# Patient Record
Sex: FEMALE | Race: WHITE | NOT HISPANIC OR LATINO | Employment: FULL TIME | URBAN - METROPOLITAN AREA
[De-identification: names, ages, dates, MRNs, and addresses within clinical notes are randomized per-mention and may not be internally consistent; named-entity substitution may affect disease eponyms.]

---

## 2021-06-29 ENCOUNTER — TRANSCRIBE ORDERS (OUTPATIENT)
Dept: ADMINISTRATIVE | Facility: HOSPITAL | Age: 41
End: 2021-06-29

## 2021-06-29 DIAGNOSIS — Z12.31 ENCOUNTER FOR MAMMOGRAM TO ESTABLISH BASELINE MAMMOGRAM: Primary | ICD-10-CM

## 2021-07-19 ENCOUNTER — HOSPITAL ENCOUNTER (OUTPATIENT)
Dept: MAMMOGRAPHY | Facility: HOSPITAL | Age: 41
Discharge: HOME | End: 2021-07-19
Admitting: NURSE PRACTITIONER

## 2021-07-19 DIAGNOSIS — Z12.31 ENCOUNTER FOR MAMMOGRAM TO ESTABLISH BASELINE MAMMOGRAM: ICD-10-CM

## 2021-07-19 PROCEDURE — 77067 SCR MAMMO BI INCL CAD: CPT

## 2021-07-22 ENCOUNTER — TRANSCRIBE ORDERS (OUTPATIENT)
Dept: MAMMOGRAPHY | Facility: HOSPITAL | Age: 41
End: 2021-07-22

## 2021-07-22 DIAGNOSIS — N63.20 LEFT BREAST MASS: Primary | ICD-10-CM

## 2021-07-29 ENCOUNTER — TRANSCRIBE ORDERS (OUTPATIENT)
Dept: ADMINISTRATIVE | Facility: HOSPITAL | Age: 41
End: 2021-07-29

## 2021-07-29 ENCOUNTER — HOSPITAL ENCOUNTER (OUTPATIENT)
Dept: MAMMOGRAPHY | Facility: HOSPITAL | Age: 41
Discharge: HOME | End: 2021-07-29

## 2021-07-29 ENCOUNTER — HOSPITAL ENCOUNTER (OUTPATIENT)
Dept: ULTRASOUND IMAGING | Facility: HOSPITAL | Age: 41
Discharge: HOME | End: 2021-07-29

## 2021-07-29 DIAGNOSIS — N63.20 LEFT BREAST MASS: ICD-10-CM

## 2021-07-29 DIAGNOSIS — R92.1 BREAST CALCIFICATIONS: Primary | ICD-10-CM

## 2021-07-29 PROCEDURE — 77065 DX MAMMO INCL CAD UNI: CPT

## 2021-07-29 PROCEDURE — 76641 ULTRASOUND BREAST COMPLETE: CPT

## 2021-08-09 ENCOUNTER — HOSPITAL ENCOUNTER (OUTPATIENT)
Dept: MAMMOGRAPHY | Facility: HOSPITAL | Age: 41
Discharge: HOME | End: 2021-08-09

## 2021-08-09 ENCOUNTER — DOCUMENTATION (OUTPATIENT)
Dept: MAMMOGRAPHY | Facility: HOSPITAL | Age: 41
End: 2021-08-09

## 2021-08-09 DIAGNOSIS — R92.1 BREAST CALCIFICATIONS: ICD-10-CM

## 2021-08-09 PROCEDURE — 19081 BX BREAST 1ST LESION STRTCTC: CPT | Performed by: RADIOLOGY

## 2021-08-09 PROCEDURE — 77065 DX MAMMO INCL CAD UNI: CPT | Performed by: RADIOLOGY

## 2021-08-09 PROCEDURE — 76098 X-RAY EXAM SURGICAL SPECIMEN: CPT

## 2021-08-09 PROCEDURE — 25010000003 LIDOCAINE 1 % SOLUTION: Performed by: NURSE PRACTITIONER

## 2021-08-09 PROCEDURE — 88305 TISSUE EXAM BY PATHOLOGIST: CPT | Performed by: NURSE PRACTITIONER

## 2021-08-09 PROCEDURE — 76098 X-RAY EXAM SURGICAL SPECIMEN: CPT | Performed by: RADIOLOGY

## 2021-08-09 RX ORDER — LIDOCAINE HYDROCHLORIDE 10 MG/ML
20 INJECTION, SOLUTION INFILTRATION; PERINEURAL ONCE
Status: COMPLETED | OUTPATIENT
Start: 2021-08-09 | End: 2021-08-09

## 2021-08-09 RX ORDER — LIDOCAINE HYDROCHLORIDE AND EPINEPHRINE 10; 10 MG/ML; UG/ML
10 INJECTION, SOLUTION INFILTRATION; PERINEURAL ONCE
Status: COMPLETED | OUTPATIENT
Start: 2021-08-09 | End: 2021-08-09

## 2021-08-09 RX ADMIN — LIDOCAINE HYDROCHLORIDE,EPINEPHRINE BITARTRATE 10 ML: 10; .01 INJECTION, SOLUTION INFILTRATION; PERINEURAL at 08:44

## 2021-08-09 RX ADMIN — LIDOCAINE HYDROCHLORIDE 20 ML: 10 INJECTION, SOLUTION INFILTRATION; PERINEURAL at 08:44

## 2021-08-09 NOTE — PROGRESS NOTES
SPOKE WITH PATIENT AFTER HER BIOPSY AND DISCUSSED DISCHARGE INSTRUCTIONS WITH ICE PACKS FOR PAIN CONTROL AND PT V/U. PT REPORTED THAT THIS IS HER BASELINE AND THAT IT WAS CAUGHT ON SCREENING AND SHE DOES NOT HAVE ANY OTHER COMPLAINTS RELATED TO HER BREASTS. PT HAD A MISCARRIAGE AT AGE 14 AND HAD NO OTHER CHILDREN. PT HAD HER PERIOD AT AGE 12. PT STATED THAT SHE HAD HAD SOME PRE CANCER CELLS IN HER FEMALE PARTS NOT SURE IT WAS CERVICAL, UTERINE, OR OVARIAN. SHE HAD A PARTIAL HYSTERECTOMY IN 2016 AND THE HER OVARIES REMOVED IN 2017 AND THAT WAS ALL CLEAR OF CANCER, BUT HAD SEVERAL CYSTS. PT REPORTED NOT TAKING ANY HORMONE REPLACEMENT THERAPY DUE TO HER HAVING PASTS STROKES. PT'S FATHER HAD PANCREATIC CANCER AND HER PATERNAL AUNT HAD BREAST CANCER, BOTH PATERNAL GRANDPARENTS HAD LUNG CANCER. I GAVE PATIENT MY CARD WITH MY CONTACT INFORMATION AND ENCOURAGED HER TO CALL WITH ANY QUESTIONS OR CONCERNS AND SHE V/U.

## 2021-08-10 LAB
CYTO UR: NORMAL
LAB AP CASE REPORT: NORMAL
LAB AP CLINICAL INFORMATION: NORMAL
PATH REPORT.FINAL DX SPEC: NORMAL
PATH REPORT.GROSS SPEC: NORMAL

## 2022-07-09 ENCOUNTER — APPOINTMENT (OUTPATIENT)
Dept: GENERAL RADIOLOGY | Facility: HOSPITAL | Age: 42
End: 2022-07-09

## 2022-07-09 ENCOUNTER — HOSPITAL ENCOUNTER (EMERGENCY)
Facility: HOSPITAL | Age: 42
Discharge: HOME | End: 2022-07-09
Attending: EMERGENCY MEDICINE | Admitting: EMERGENCY MEDICINE

## 2022-07-09 VITALS
DIASTOLIC BLOOD PRESSURE: 79 MMHG | SYSTOLIC BLOOD PRESSURE: 138 MMHG | BODY MASS INDEX: 25.86 KG/M2 | RESPIRATION RATE: 19 BRPM | WEIGHT: 151.46 LBS | OXYGEN SATURATION: 99 % | TEMPERATURE: 98 F | HEART RATE: 88 BPM | HEIGHT: 64 IN

## 2022-07-09 DIAGNOSIS — S52.592A OTHER CLOSED FRACTURE OF DISTAL END OF LEFT RADIUS, INITIAL ENCOUNTER: Primary | ICD-10-CM

## 2022-07-09 PROCEDURE — 99283 EMERGENCY DEPT VISIT LOW MDM: CPT

## 2022-07-09 PROCEDURE — 73110 X-RAY EXAM OF WRIST: CPT

## 2022-07-09 RX ORDER — HYDROCODONE BITARTRATE AND ACETAMINOPHEN 7.5; 325 MG/1; MG/1
1 TABLET ORAL ONCE
Status: COMPLETED | OUTPATIENT
Start: 2022-07-09 | End: 2022-07-09

## 2022-07-09 RX ORDER — HYDROCODONE BITARTRATE AND ACETAMINOPHEN 5; 325 MG/1; MG/1
1 TABLET ORAL EVERY 6 HOURS PRN
Qty: 15 TABLET | Refills: 0 | Status: SHIPPED | OUTPATIENT
Start: 2022-07-09 | End: 2022-07-13

## 2022-07-09 RX ADMIN — HYDROCODONE BITARTRATE AND ACETAMINOPHEN 1 TABLET: 7.5; 325 TABLET ORAL at 17:22

## 2022-07-09 NOTE — DISCHARGE INSTRUCTIONS
Take pain medication as needed.  Ensure that she call Dr. Stewart office to schedule a follow-up appointment for further care of your wrist fracture.  Rest and elevate the left arm.  You may apply ice over the splint as needed to improve pain.

## 2022-07-09 NOTE — ED PROVIDER NOTES
Time: 5:16 PM EDT  Arrived by: private car  Chief Complaint: Left wrist pain  History provided by: Patient  History is limited by: N/A     History of Present Illness:  Patient is a 42 y.o. year old female who presents to the emergency department with left wrist pain.    Patient states today around 1430 she was at the [employer] park with her kids and fell backwards catching herself with her hands.  Patient states after she fell she immediately had left wrist pain and felt as if it was weaker than the right.  Patient denies any previous injury tenderness.  Patient states that pain is currently 10 out of 10.  No other complaints.      History provided by:  Patient   used: No    Wrist Injury  Location:  Wrist  Wrist location:  L wrist  Injury: yes    Time since incident: 2 hours.  Mechanism of injury: fall    Fall:     Fall occurred: Skating.    Impact surface:  Hard floor    Point of impact:  Hands and buttocks    Entrapped after fall: no    Pain details:     Quality:  Aching    Radiates to:  Does not radiate    Severity:  Severe    Onset quality:  Sudden    Timing:  Constant    Progression:  Unchanged  Prior injury to area:  No  Relieved by:  None tried  Worsened by:  Movement  Ineffective treatments:  None tried  Associated symptoms: decreased range of motion and swelling    Associated symptoms: no back pain, no fatigue, no fever, no muscle weakness, no neck pain, no numbness, no stiffness and no tingling        Similar Symptoms Previously: No  Recently seen: No      Patient Care Team  Primary Care Provider: Bonnie Shelton APRN    Past Medical History:     Allergies   Allergen Reactions   • Oxycodone Itching     History reviewed. No pertinent past medical history.  History reviewed. No pertinent surgical history.  Family History   Problem Relation Age of Onset   • Breast cancer Paternal Aunt        Home Medications:  Prior to Admission medications    Not on File        Social History:  "  Social History     Tobacco Use   • Smoking status: Never Smoker   • Smokeless tobacco: Never Used   Substance Use Topics   • Alcohol use: Not Currently   • Drug use: Never     Recent travel: no     Review of Systems:  Review of Systems   Constitutional: Negative for chills, fatigue and fever.   HENT: Negative for ear pain.    Eyes: Negative for pain.   Respiratory: Negative for cough and shortness of breath.    Cardiovascular: Negative for chest pain.   Gastrointestinal: Negative for abdominal pain, diarrhea, nausea and vomiting.   Genitourinary: Negative for dysuria.   Musculoskeletal: Negative for arthralgias, back pain, neck pain and stiffness.        Left wrist pain   Skin: Negative for rash.   Neurological: Negative for headaches.        Physical Exam:  /79 (BP Location: Left arm, Patient Position: Sitting)   Pulse 88   Temp 98 °F (36.7 °C) (Oral)   Resp 19   Ht 162.6 cm (64\")   Wt 68.7 kg (151 lb 7.3 oz)   SpO2 99%   BMI 26.00 kg/m²     Physical Exam  Vitals and nursing note reviewed.   Constitutional:       General: She is not in acute distress.     Appearance: Normal appearance. She is normal weight. She is not ill-appearing.   HENT:      Head: Normocephalic and atraumatic.      Nose: Nose normal.   Eyes:      Extraocular Movements: Extraocular movements intact.      Conjunctiva/sclera: Conjunctivae normal.      Pupils: Pupils are equal, round, and reactive to light.   Cardiovascular:      Rate and Rhythm: Normal rate and regular rhythm.   Pulmonary:      Effort: Pulmonary effort is normal.   Musculoskeletal:         General: Normal range of motion.      Right wrist: Normal.        Arms:       Cervical back: Normal range of motion and neck supple.      Comments: Mild swelling noted as mentioned in the diagram above.  No deformity, laceration, ecchymosis noted decreased range of motion of the left wrist, also unable to evaluate due to pain.  Neurovascularly intact.   Skin:     General: Skin is " warm and dry.   Neurological:      General: No focal deficit present.      Mental Status: She is alert and oriented to person, place, and time.   Psychiatric:         Mood and Affect: Mood normal.         Behavior: Behavior normal.         Thought Content: Thought content normal.         Judgment: Judgment normal.                Medications in the Emergency Department:  Medications   HYDROcodone-acetaminophen (NORCO) 7.5-325 MG per tablet 1 tablet (1 tablet Oral Given 7/9/22 1722)        Labs  Lab Results (last 24 hours)     ** No results found for the last 24 hours. **           Imaging:  XR Wrist 3+ View Left    Result Date: 7/9/2022  PROCEDURE: XR WRIST 3+ VW LEFT  COMPARISON: None  INDICATIONS: injury/LEFT WRIST PAIN  FINDINGS:  Transverse fracture of the distal radial metaphysis is seen, with 20 degrees of dorsal angulation.  Numerous sub cm comminuted fragments are evident.  No definite extension to the articular surface is evident.  No abnormality of the distal ulna or carpal bones is evident.        Left wrist series demonstrating transverse fracture of the distal radial metaphysis with 20 degrees of dorsal angulation.  Numerous sub cm comminuted fragments are evident.      MARIO FRANCIS MD       Electronically Signed and Approved By: MARIO FRANCIS MD on 7/09/2022 at 16:50               Procedures:  Procedures    Progress  ED Course as of 07/09/22 1819   Sat Jul 09, 2022   1803 Thumb spica orthoglass was placed at this time and evaluated by me. [MD]      ED Course User Index  [MD] Jude Ba PA-C                            Medical Decision Making:  MDM  Number of Diagnoses or Management Options  Diagnosis management comments: I have spoken with patient. I have explained the patient´s condition, diagnoses and treatment plan based on the information available to me at this time. I have answered the patient's questions and addressed any concerns. The patient has a good  understanding of the patient´s  diagnosis, condition, and treatment plan as can be expected at this point. The vital signs have been stable. The patient´s condition is stable and appropriate for discharge from the emergency department.      The patient will pursue further outpatient evaluation with the primary care physician or other designated or consulting physician as outlined in the discharge instructions. They are agreeable to this plan of care and follow-up instructions have been explained in detail. The patient has received these instructions in written format and have expressed an understanding of the discharge instructions. The patient is aware that any significant change in condition or worsening of symptoms should prompt an immediate return to this or the closest emergency department or call to 911.       Amount and/or Complexity of Data Reviewed  Tests in the radiology section of CPT®: reviewed and ordered    Risk of Complications, Morbidity, and/or Mortality  Presenting problems: moderate  Diagnostic procedures: low  Management options: low    Patient Progress  Patient progress: stable       Final diagnoses:   Other closed fracture of distal end of left radius, initial encounter        Disposition:  ED Disposition     ED Disposition   Discharge    Condition   Stable    Comment   --             This medical record created using voice recognition software.           Jude Ba PA-C  07/09/22 [id number]

## 2022-07-09 NOTE — ED PROVIDER NOTES
"Patient is 42 y.o. year old female that presents to the ED for evaluation of left wrist injury.     Physical Exam    ED Course:    /79 (BP Location: Left arm, Patient Position: Sitting)   Pulse 88   Temp 98 °F (36.7 °C) (Oral)   Resp 19   Ht 162.6 cm (64\")   Wt 68.7 kg (151 lb 7.3 oz)   SpO2 99%   BMI 26.00 kg/m²   Results for orders placed or performed during the hospital encounter of 08/09/21   Tissue Pathology Exam    Specimen: Breast, Left; Tissue   Result Value Ref Range    Case Report       Surgical Pathology Report                         Case: [id number]                                  Authorizing Provider:  Omayra Wheeler MD     Collected:           08/09/2021 08:37 AM          Ordering Location:     [facility]      Received:            08/09/2021 09:18 AM                                 MAMMOGRAPHY                                                                  Pathologist:           Isabelle Interiano MD                                                     Specimen:    Breast, Left, LT BREAST STEREO BX/9:00                                                     Clinical Information       Breast calcifications      Final Diagnosis       Left breast, 9 o'clock position, stereotactic-guided core biopsy:   - Fibrosis  - Microcalcifications  - Fat necrosis  - Negative for atypia and malignancy        Gross Description       LT breast stereo BX/9:00: Received in formalin in a multi chambered x-ray specimen collection device are needle core biopsies of yellow to white fibrofatty tissue measuring 2.5 cm in greatest aggregate dimension.  Cores are found within chambers A through K with chambers C through I identified as containing microcalcifications.  All 1A and 1B.  1A-cores from chambers identified as containing microcalcifications, 1B-remainder CRE      Microscopic Description       Medications   HYDROcodone-acetaminophen (NORCO) 7.5-325 MG per tablet 1 tablet (1 tablet Oral Given " 7/9/22 1722)     XR Wrist 3+ View Left    Result Date: 7/9/2022  Narrative: PROCEDURE: XR WRIST 3+ VW LEFT  COMPARISON: None  INDICATIONS: injury/LEFT WRIST PAIN  FINDINGS:  Transverse fracture of the distal radial metaphysis is seen, with 20 degrees of dorsal angulation.  Numerous sub cm comminuted fragments are evident.  No definite extension to the articular surface is evident.  No abnormality of the distal ulna or carpal bones is evident.      Impression:   Left wrist series demonstrating transverse fracture of the distal radial metaphysis with 20 degrees of dorsal angulation.  Numerous sub cm comminuted fragments are evident.      MARIO FRANCIS MD       Electronically Signed and Approved By: MARIO FRANCIS MD on 7/09/2022 at 16:50               MDM:  X-rays reviewed which showed a left wrist fracture.  Patient will be splinted with pressure applied in attempt to decrease angulation of the fracture.  Patient referred to orthopedics for outpatient follow-up.  Procedures      The case was discussed between the AKIL and myself. Patient  care including, but not limited to ordered imaging, medications, and lab results were reviewed. I then performed the substantive portion of the visit including all aspects of the medical decision making.        Kings Alonzo DO  17:24 EDT  07/09/22       Kings Alonzo DO  07/09/22 6601

## 2022-07-11 ENCOUNTER — OFFICE VISIT (OUTPATIENT)
Dept: ORTHOPEDIC SURGERY | Facility: CLINIC | Age: 42
End: 2022-07-11

## 2022-07-11 ENCOUNTER — PREP FOR SURGERY (OUTPATIENT)
Dept: OTHER | Facility: HOSPITAL | Age: 42
End: 2022-07-11

## 2022-07-11 ENCOUNTER — LAB (OUTPATIENT)
Dept: LAB | Facility: HOSPITAL | Age: 42
End: 2022-07-11

## 2022-07-11 VITALS — HEART RATE: 88 BPM | BODY MASS INDEX: 25.44 KG/M2 | OXYGEN SATURATION: 98 % | HEIGHT: 64 IN | WEIGHT: 149 LBS

## 2022-07-11 DIAGNOSIS — S52.502A CLOSED FRACTURE OF DISTAL END OF LEFT RADIUS, UNSPECIFIED FRACTURE MORPHOLOGY, INITIAL ENCOUNTER: Primary | ICD-10-CM

## 2022-07-11 DIAGNOSIS — S52.502A CLOSED FRACTURE OF DISTAL END OF LEFT RADIUS, UNSPECIFIED FRACTURE MORPHOLOGY, INITIAL ENCOUNTER: ICD-10-CM

## 2022-07-11 PROCEDURE — U0004 COV-19 TEST NON-CDC HGH THRU: HCPCS

## 2022-07-11 PROCEDURE — 99203 OFFICE O/P NEW LOW 30 MIN: CPT | Performed by: STUDENT IN AN ORGANIZED HEALTH CARE EDUCATION/TRAINING PROGRAM

## 2022-07-11 RX ORDER — CEFAZOLIN SODIUM 2 G/100ML
2 INJECTION, SOLUTION INTRAVENOUS ONCE
Status: CANCELLED | OUTPATIENT
Start: 2022-07-11 | End: 2022-07-11

## 2022-07-11 RX ORDER — HYDROCODONE BITARTRATE AND ACETAMINOPHEN 5; 325 MG/1; MG/1
1 TABLET ORAL EVERY 6 HOURS PRN
Qty: 20 TABLET | Refills: 0 | Status: SHIPPED | OUTPATIENT
Start: 2022-07-11 | End: 2022-07-14 | Stop reason: HOSPADM

## 2022-07-11 RX ORDER — CEFAZOLIN SODIUM IN 0.9 % NACL 3 G/100 ML
3 INTRAVENOUS SOLUTION, PIGGYBACK (ML) INTRAVENOUS ONCE
Status: CANCELLED | OUTPATIENT
Start: 2022-07-11 | End: 2022-07-11

## 2022-07-11 RX ORDER — ASPIRIN 81 MG/1
81 TABLET, CHEWABLE ORAL
COMMUNITY
End: 2022-09-30

## 2022-07-11 NOTE — PROGRESS NOTES
"Chief Complaint  Pain of the Left Wrist    Subjective          Shanell Rollins presents to [facility] for   History of Present Illness    The patient presents here today for evaluation of the left wrist. The patient went skating and fell causing a left wrist injury. She was seen and evaluated with x-rays and placed into a splint. She has no other complaints. She is right hand dominate.  She denies any numbness.  She denies any prior left wrist injuries/surgeries.    Allergies   Allergen Reactions   • Oxycodone Itching        Social History     Socioeconomic History   • Marital status:    Tobacco Use   • Smoking status: Former Smoker   • Smokeless tobacco: Never Used   Vaping Use   • Vaping Use: Every day   • Substances: Nicotine, Flavoring   • Devices: Refillable tank   Substance and Sexual Activity   • Alcohol use: Not Currently   • Drug use: Never   • Sexual activity: Defer        I reviewed the patient's chief complaint, history of present illness, review of systems, past medical history, surgical history, family history, social history, medications, and allergy list.     REVIEW OF SYSTEMS    Constitutional: Denies fevers, chills, weight loss  Cardiovascular: Denies chest pain, shortness of breath  Skin: Denies rashes, acute skin changes  Neurologic: Denies headache, loss of consciousness  MSK: Left wrist pain      Objective   Vital Signs:   Pulse 88   Ht 162.6 cm (64\")   Wt 67.6 kg (149 lb)   SpO2 98%   BMI 25.58 kg/m²     Body mass index is 25.58 kg/m².    Physical Exam    General: Alert. No acute distress.   Left wrist- splint intact, well fitting. Clean and dry. No wounds about the splint edges. Good capillary refill. Intact finger and thumb ROM.  Sensation intact in the median, radial, ulnar nerve distributions.  Motor intact with AIN, PIN, ulnar function.    Procedures    Imaging Results (Most Recent)     None                   Assessment and Plan        XR Wrist " 3+ View Left    Result Date: 7/9/2022  Narrative: PROCEDURE: XR WRIST 3+ VW LEFT  COMPARISON: None  INDICATIONS: injury/LEFT WRIST PAIN  FINDINGS:  Transverse fracture of the distal radial metaphysis is seen, with 20 degrees of dorsal angulation.  Numerous sub cm comminuted fragments are evident.  No definite extension to the articular surface is evident.  No abnormality of the distal ulna or carpal bones is evident.      Impression:   Left wrist series demonstrating transverse fracture of the distal radial metaphysis with 20 degrees of dorsal angulation.  Numerous sub cm comminuted fragments are evident.      MARIO FRANCIS MD       Electronically Signed and Approved By: MARIO FRANCIS MD on 7/09/2022 at 16:50                Diagnoses and all orders for this visit:    1. Closed fracture of distal end of left radius, unspecified fracture morphology, initial encounter (Primary)        Discussed the treatment options with the patient, operative vs non-operative. Discussed the risks and benefits of an open reduction internal fixation left distal radius fracture. The patient expressed understanding and wished to proceed.       Will obtain X-Rays of Left wrist at next visit.     Discussed surgery., Risks/benefits discussed with patient including, but not limited to: infection, bleeding, neurovascular damage, malunion, nonunion, aesthetic deformity, need for further surgery, and death., Discussed with patient the implant type being used during surgery and patient understands and desires to proceed., Surgery pamphlet given. and Call or return if worsening symptoms.    Scribed for Cole Velásquez MD by Liliana Land  07/11/2022   14:53 EDT         Follow Up   Return for 2 weeks postop .  Patient was given instructions and counseling regarding her condition or for health maintenance advice. Please see specific information pulled into the AVS if appropriate.       I have personally performed the services described in  this document as scribed by the above individual and it is both accurate and complete.     Cole Velásquez MD  07/11/22  14:57 EDT

## 2022-07-11 NOTE — H&P (VIEW-ONLY)
"Chief Complaint  Pain of the Left Wrist    Subjective          Shanell Rollins presents to [facility] for   History of Present Illness    The patient presents here today for evaluation of the left wrist. The patient went skating and fell causing a left wrist injury. She was seen and evaluated with x-rays and placed into a splint. She has no other complaints. She is right hand dominate.  She denies any numbness.  She denies any prior left wrist injuries/surgeries.    Allergies   Allergen Reactions   • Oxycodone Itching        Social History     Socioeconomic History   • Marital status:    Tobacco Use   • Smoking status: Former Smoker   • Smokeless tobacco: Never Used   Vaping Use   • Vaping Use: Every day   • Substances: Nicotine, Flavoring   • Devices: Refillable tank   Substance and Sexual Activity   • Alcohol use: Not Currently   • Drug use: Never   • Sexual activity: Defer        I reviewed the patient's chief complaint, history of present illness, review of systems, past medical history, surgical history, family history, social history, medications, and allergy list.     REVIEW OF SYSTEMS    Constitutional: Denies fevers, chills, weight loss  Cardiovascular: Denies chest pain, shortness of breath  Skin: Denies rashes, acute skin changes  Neurologic: Denies headache, loss of consciousness  MSK: Left wrist pain      Objective   Vital Signs:   Pulse 88   Ht 162.6 cm (64\")   Wt 67.6 kg (149 lb)   SpO2 98%   BMI 25.58 kg/m²     Body mass index is 25.58 kg/m².    Physical Exam    General: Alert. No acute distress.   Left wrist- splint intact, well fitting. Clean and dry. No wounds about the splint edges. Good capillary refill. Intact finger and thumb ROM.  Sensation intact in the median, radial, ulnar nerve distributions.  Motor intact with AIN, PIN, ulnar function.    Procedures    Imaging Results (Most Recent)     None                   Assessment and Plan        XR Wrist " 3+ View Left    Result Date: 7/9/2022  Narrative: PROCEDURE: XR WRIST 3+ VW LEFT  COMPARISON: None  INDICATIONS: injury/LEFT WRIST PAIN  FINDINGS:  Transverse fracture of the distal radial metaphysis is seen, with 20 degrees of dorsal angulation.  Numerous sub cm comminuted fragments are evident.  No definite extension to the articular surface is evident.  No abnormality of the distal ulna or carpal bones is evident.      Impression:   Left wrist series demonstrating transverse fracture of the distal radial metaphysis with 20 degrees of dorsal angulation.  Numerous sub cm comminuted fragments are evident.      MARIO FRANCIS MD       Electronically Signed and Approved By: MARIO FRANCIS MD on 7/09/2022 at 16:50                Diagnoses and all orders for this visit:    1. Closed fracture of distal end of left radius, unspecified fracture morphology, initial encounter (Primary)        Discussed the treatment options with the patient, operative vs non-operative. Discussed the risks and benefits of an open reduction internal fixation left distal radius fracture. The patient expressed understanding and wished to proceed.       Will obtain X-Rays of Left wrist at next visit.     Discussed surgery., Risks/benefits discussed with patient including, but not limited to: infection, bleeding, neurovascular damage, malunion, nonunion, aesthetic deformity, need for further surgery, and death., Discussed with patient the implant type being used during surgery and patient understands and desires to proceed., Surgery pamphlet given. and Call or return if worsening symptoms.    Scribed for Cole Velásquez MD by Liliana Land  07/11/2022   14:53 EDT         Follow Up   Return for 2 weeks postop .  Patient was given instructions and counseling regarding her condition or for health maintenance advice. Please see specific information pulled into the AVS if appropriate.       I have personally performed the services described in  this document as scribed by the above individual and it is both accurate and complete.     Cole Velásquez MD  07/11/22  14:57 EDT

## 2022-07-12 LAB — SARS-COV-2 RNA PNL SPEC NAA+PROBE: NOT DETECTED

## 2022-07-13 NOTE — PRE-PROCEDURE INSTRUCTIONS
IMPORTANT INSTRUCTIONS - PRE-ADMISSION TESTING  1. DO NOT EAT OR CHEW anything after midnight the night before your procedure.    2. You may have CLEAR liquids up to ____2__ hours prior to ARRIVAL time.   3. Take the following medications the morning of your procedure with JUST A SIP OF WATER:  ______NORCO IF NEEDED_________________________________________________________________________________________________________________________________________________________________________________    4. DO NOT BRING your medications to the hospital with you, UNLESS something has changed since your PRE-Admission Testing appointment.  5. Hold all vitamins, supplements, and NSAIDS (Non- steroidal anti-inflammatory meds) for one week prior to surgery (you MAY take Tylenol or Acetaminophen).  6. If you are diabetic, check your blood sugar the morning of your procedure. If it is less than 70 or if you are feeling symptomatic, call the following number for further instructions: [phone]  7. Use your inhalers/nebulizers as usual, the morning of your procedure. BRING YOUR INHALERS with you.   8. Bring your CPAP or BIPAP to hospital, ONLY IF YOU WILL BE SPENDING THE NIGHT.   9. Make sure you have a ride home and have someone who will stay with you the day of your procedure after you go home.  10. If you have any questions, please call your Pre-Admission Testing Nurse, ___CESILIA_____________ at [phone]__.   11. Per anesthesia request, do not smoke for 24 hours before your procedure or as instructed by your surgeon.    12. BATHING INSTRUCTIONS GIVEN. NO JEWELRY DAY OF PROCEDURE. NO NAIL POLISH UPPER OR LOWER EXTREMITIES.  13. ELEVATOR A 3RD FLOOR  14. CASH, CHECK, OR CARD FOR MED TO BED IF INDICATED AT DISCHARGE  15. NO VAPING FOR 24 HOURS PRIOR TO PROCEDURE

## 2022-07-14 ENCOUNTER — APPOINTMENT (OUTPATIENT)
Dept: GENERAL RADIOLOGY | Facility: HOSPITAL | Age: 42
End: 2022-07-14

## 2022-07-14 ENCOUNTER — HOSPITAL ENCOUNTER (OUTPATIENT)
Facility: HOSPITAL | Age: 42
Setting detail: HOSPITAL OUTPATIENT SURGERY
Discharge: HOME | End: 2022-07-14
Attending: STUDENT IN AN ORGANIZED HEALTH CARE EDUCATION/TRAINING PROGRAM | Admitting: STUDENT IN AN ORGANIZED HEALTH CARE EDUCATION/TRAINING PROGRAM

## 2022-07-14 ENCOUNTER — ANESTHESIA EVENT (OUTPATIENT)
Dept: PERIOP | Facility: HOSPITAL | Age: 42
End: 2022-07-14

## 2022-07-14 ENCOUNTER — ANESTHESIA (OUTPATIENT)
Dept: PERIOP | Facility: HOSPITAL | Age: 42
End: 2022-07-14

## 2022-07-14 VITALS
DIASTOLIC BLOOD PRESSURE: 83 MMHG | OXYGEN SATURATION: 100 % | TEMPERATURE: 97.4 F | HEIGHT: 63 IN | SYSTOLIC BLOOD PRESSURE: 124 MMHG | WEIGHT: 143.3 LBS | RESPIRATION RATE: 16 BRPM | HEART RATE: 74 BPM | BODY MASS INDEX: 25.39 KG/M2

## 2022-07-14 DIAGNOSIS — S52.502A CLOSED FRACTURE OF DISTAL END OF LEFT RADIUS, UNSPECIFIED FRACTURE MORPHOLOGY, INITIAL ENCOUNTER: ICD-10-CM

## 2022-07-14 PROCEDURE — C1713 ANCHOR/SCREW BN/BN,TIS/BN: HCPCS | Performed by: STUDENT IN AN ORGANIZED HEALTH CARE EDUCATION/TRAINING PROGRAM

## 2022-07-14 PROCEDURE — 25010000002 HYDROMORPHONE 1 MG/ML SOLUTION: Performed by: ANESTHESIOLOGY

## 2022-07-14 PROCEDURE — 25010000002 ONDANSETRON PER 1 MG: Performed by: NURSE ANESTHETIST, CERTIFIED REGISTERED

## 2022-07-14 PROCEDURE — 73100 X-RAY EXAM OF WRIST: CPT

## 2022-07-14 PROCEDURE — 25607 OPTX DST RD XARTC FX/EPI SEP: CPT | Performed by: STUDENT IN AN ORGANIZED HEALTH CARE EDUCATION/TRAINING PROGRAM

## 2022-07-14 PROCEDURE — 25010000002 MIDAZOLAM PER 1 MG: Performed by: ANESTHESIOLOGY

## 2022-07-14 PROCEDURE — 25010000002 CEFAZOLIN IN DEXTROSE 2-4 GM/100ML-% SOLUTION: Performed by: STUDENT IN AN ORGANIZED HEALTH CARE EDUCATION/TRAINING PROGRAM

## 2022-07-14 PROCEDURE — 0 MEPERIDINE PER 100 MG: Performed by: NURSE ANESTHETIST, CERTIFIED REGISTERED

## 2022-07-14 PROCEDURE — 76942 ECHO GUIDE FOR BIOPSY: CPT | Performed by: STUDENT IN AN ORGANIZED HEALTH CARE EDUCATION/TRAINING PROGRAM

## 2022-07-14 PROCEDURE — 76000 FLUOROSCOPY <1 HR PHYS/QHP: CPT

## 2022-07-14 PROCEDURE — 25010000002 HYDROMORPHONE 1 MG/ML SOLUTION: Performed by: NURSE ANESTHETIST, CERTIFIED REGISTERED

## 2022-07-14 PROCEDURE — 25010000002 FENTANYL CITRATE (PF) 50 MCG/ML SOLUTION: Performed by: NURSE ANESTHETIST, CERTIFIED REGISTERED

## 2022-07-14 PROCEDURE — 25607 OPTX DST RD XARTC FX/EPI SEP: CPT | Performed by: PHYSICIAN ASSISTANT

## 2022-07-14 PROCEDURE — 25010000002 PROPOFOL 10 MG/ML EMULSION: Performed by: NURSE ANESTHETIST, CERTIFIED REGISTERED

## 2022-07-14 PROCEDURE — 0 LIDOCAINE 1 % SOLUTION: Performed by: NURSE ANESTHETIST, CERTIFIED REGISTERED

## 2022-07-14 PROCEDURE — 25010000002 DEXAMETHASONE PER 1 MG: Performed by: NURSE ANESTHETIST, CERTIFIED REGISTERED

## 2022-07-14 DEVICE — BONE SCREW, FULLY THREADED, T8
Type: IMPLANTABLE DEVICE | Site: WRIST | Status: FUNCTIONAL
Brand: VARIAX

## 2022-07-14 DEVICE — LOCKING SCREW, FULLY THREADED,T8
Type: IMPLANTABLE DEVICE | Site: WRIST | Status: FUNCTIONAL
Brand: VARIAX

## 2022-07-14 DEVICE — VOLAR PLATE INTERMEDIATE LEFT, X-SHORT
Type: IMPLANTABLE DEVICE | Site: WRIST | Status: FUNCTIONAL
Brand: VARIAX

## 2022-07-14 RX ORDER — PROMETHAZINE HYDROCHLORIDE 12.5 MG/1
25 TABLET ORAL ONCE AS NEEDED
Status: DISCONTINUED | OUTPATIENT
Start: 2022-07-14 | End: 2022-07-14 | Stop reason: HOSPADM

## 2022-07-14 RX ORDER — ONDANSETRON 4 MG/1
4 TABLET, FILM COATED ORAL EVERY 8 HOURS PRN
Qty: 30 TABLET | Refills: 0 | Status: SHIPPED | OUTPATIENT
Start: 2022-07-14 | End: 2022-09-30

## 2022-07-14 RX ORDER — SODIUM CHLORIDE, SODIUM LACTATE, POTASSIUM CHLORIDE, CALCIUM CHLORIDE 600; 310; 30; 20 MG/100ML; MG/100ML; MG/100ML; MG/100ML
9 INJECTION, SOLUTION INTRAVENOUS CONTINUOUS PRN
Status: DISCONTINUED | OUTPATIENT
Start: 2022-07-14 | End: 2022-07-14 | Stop reason: HOSPADM

## 2022-07-14 RX ORDER — DEXAMETHASONE SODIUM PHOSPHATE 4 MG/ML
INJECTION, SOLUTION INTRA-ARTICULAR; INTRALESIONAL; INTRAMUSCULAR; INTRAVENOUS; SOFT TISSUE AS NEEDED
Status: DISCONTINUED | OUTPATIENT
Start: 2022-07-14 | End: 2022-07-14 | Stop reason: SURG

## 2022-07-14 RX ORDER — ONDANSETRON 2 MG/ML
4 INJECTION INTRAMUSCULAR; INTRAVENOUS ONCE AS NEEDED
Status: DISCONTINUED | OUTPATIENT
Start: 2022-07-14 | End: 2022-07-14 | Stop reason: HOSPADM

## 2022-07-14 RX ORDER — ACETAMINOPHEN 500 MG
1000 TABLET ORAL ONCE
Status: COMPLETED | OUTPATIENT
Start: 2022-07-14 | End: 2022-07-14

## 2022-07-14 RX ORDER — HYDROCODONE BITARTRATE AND ACETAMINOPHEN 7.5; 325 MG/1; MG/1
1 TABLET ORAL EVERY 4 HOURS PRN
Qty: 40 TABLET | Refills: 0 | Status: SHIPPED | OUTPATIENT
Start: 2022-07-14 | End: 2022-07-25 | Stop reason: SDUPTHER

## 2022-07-14 RX ORDER — PROMETHAZINE HYDROCHLORIDE 25 MG/1
25 SUPPOSITORY RECTAL ONCE AS NEEDED
Status: DISCONTINUED | OUTPATIENT
Start: 2022-07-14 | End: 2022-07-14 | Stop reason: HOSPADM

## 2022-07-14 RX ORDER — PHENYLEPHRINE HCL IN 0.9% NACL 1 MG/10 ML
SYRINGE (ML) INTRAVENOUS AS NEEDED
Status: DISCONTINUED | OUTPATIENT
Start: 2022-07-14 | End: 2022-07-14 | Stop reason: SURG

## 2022-07-14 RX ORDER — MIDAZOLAM HYDROCHLORIDE 1 MG/ML
3 INJECTION INTRAMUSCULAR; INTRAVENOUS ONCE
Status: COMPLETED | OUTPATIENT
Start: 2022-07-14 | End: 2022-07-14

## 2022-07-14 RX ORDER — ONDANSETRON 2 MG/ML
INJECTION INTRAMUSCULAR; INTRAVENOUS AS NEEDED
Status: DISCONTINUED | OUTPATIENT
Start: 2022-07-14 | End: 2022-07-14 | Stop reason: SURG

## 2022-07-14 RX ORDER — PROPOFOL 10 MG/ML
VIAL (ML) INTRAVENOUS AS NEEDED
Status: DISCONTINUED | OUTPATIENT
Start: 2022-07-14 | End: 2022-07-14 | Stop reason: SURG

## 2022-07-14 RX ORDER — GLYCOPYRROLATE 0.2 MG/ML
0.2 INJECTION INTRAMUSCULAR; INTRAVENOUS
Status: COMPLETED | OUTPATIENT
Start: 2022-07-14 | End: 2022-07-14

## 2022-07-14 RX ORDER — LIDOCAINE HYDROCHLORIDE 10 MG/ML
INJECTION, SOLUTION INFILTRATION; PERINEURAL AS NEEDED
Status: DISCONTINUED | OUTPATIENT
Start: 2022-07-14 | End: 2022-07-14 | Stop reason: SURG

## 2022-07-14 RX ORDER — MEPERIDINE HYDROCHLORIDE 25 MG/ML
12.5 INJECTION INTRAMUSCULAR; INTRAVENOUS; SUBCUTANEOUS
Status: DISCONTINUED | OUTPATIENT
Start: 2022-07-14 | End: 2022-07-14 | Stop reason: HOSPADM

## 2022-07-14 RX ORDER — FENTANYL CITRATE 50 UG/ML
INJECTION, SOLUTION INTRAMUSCULAR; INTRAVENOUS AS NEEDED
Status: DISCONTINUED | OUTPATIENT
Start: 2022-07-14 | End: 2022-07-14 | Stop reason: SURG

## 2022-07-14 RX ORDER — GLYCOPYRROLATE 0.2 MG/ML
INJECTION INTRAMUSCULAR; INTRAVENOUS AS NEEDED
Status: DISCONTINUED | OUTPATIENT
Start: 2022-07-14 | End: 2022-07-14 | Stop reason: SURG

## 2022-07-14 RX ORDER — KETAMINE HCL IN NACL, ISO-OSM 100MG/10ML
SYRINGE (ML) INJECTION AS NEEDED
Status: DISCONTINUED | OUTPATIENT
Start: 2022-07-14 | End: 2022-07-14 | Stop reason: SURG

## 2022-07-14 RX ORDER — CEFAZOLIN SODIUM 2 G/100ML
2 INJECTION, SOLUTION INTRAVENOUS ONCE
Status: COMPLETED | OUTPATIENT
Start: 2022-07-14 | End: 2022-07-14

## 2022-07-14 RX ORDER — HYDROCODONE BITARTRATE AND ACETAMINOPHEN 5; 325 MG/1; MG/1
1 TABLET ORAL ONCE AS NEEDED
Status: COMPLETED | OUTPATIENT
Start: 2022-07-14 | End: 2022-07-14

## 2022-07-14 RX ORDER — BUPIVACAINE HYDROCHLORIDE AND EPINEPHRINE 5; 5 MG/ML; UG/ML
INJECTION, SOLUTION EPIDURAL; INTRACAUDAL; PERINEURAL
Status: COMPLETED | OUTPATIENT
Start: 2022-07-14 | End: 2022-07-14

## 2022-07-14 RX ORDER — MAGNESIUM HYDROXIDE 1200 MG/15ML
LIQUID ORAL AS NEEDED
Status: DISCONTINUED | OUTPATIENT
Start: 2022-07-14 | End: 2022-07-14 | Stop reason: HOSPADM

## 2022-07-14 RX ORDER — DOCUSATE SODIUM 100 MG/1
100 CAPSULE, LIQUID FILLED ORAL 2 TIMES DAILY PRN
Qty: 20 CAPSULE | Refills: 0 | Status: SHIPPED | OUTPATIENT
Start: 2022-07-14 | End: 2022-09-30

## 2022-07-14 RX ORDER — CEFAZOLIN SODIUM IN 0.9 % NACL 3 G/100 ML
3 INTRAVENOUS SOLUTION, PIGGYBACK (ML) INTRAVENOUS ONCE
Status: DISCONTINUED | OUTPATIENT
Start: 2022-07-14 | End: 2022-07-14 | Stop reason: SDUPTHER

## 2022-07-14 RX ADMIN — FENTANYL CITRATE 50 MCG: 50 INJECTION, SOLUTION INTRAMUSCULAR; INTRAVENOUS at 08:49

## 2022-07-14 RX ADMIN — FENTANYL CITRATE 50 MCG: 50 INJECTION, SOLUTION INTRAMUSCULAR; INTRAVENOUS at 08:31

## 2022-07-14 RX ADMIN — GLYCOPYRROLATE 0.2 MG: 0.2 INJECTION INTRAMUSCULAR; INTRAVENOUS at 07:25

## 2022-07-14 RX ADMIN — HYDROMORPHONE HYDROCHLORIDE 0.5 MG: 1 INJECTION, SOLUTION INTRAMUSCULAR; INTRAVENOUS; SUBCUTANEOUS at 11:33

## 2022-07-14 RX ADMIN — Medication 100 MCG: at 08:48

## 2022-07-14 RX ADMIN — Medication 100 MCG: at 08:56

## 2022-07-14 RX ADMIN — GLYCOPYRROLATE 0.2 MG: 0.2 INJECTION INTRAMUSCULAR; INTRAVENOUS at 08:30

## 2022-07-14 RX ADMIN — Medication 100 MCG: at 08:39

## 2022-07-14 RX ADMIN — SODIUM CHLORIDE, POTASSIUM CHLORIDE, SODIUM LACTATE AND CALCIUM CHLORIDE: 600; 310; 30; 20 INJECTION, SOLUTION INTRAVENOUS at 09:31

## 2022-07-14 RX ADMIN — MIDAZOLAM HYDROCHLORIDE 3 MG: 1 INJECTION, SOLUTION INTRAMUSCULAR; INTRAVENOUS at 07:25

## 2022-07-14 RX ADMIN — ONDANSETRON 4 MG: 2 INJECTION INTRAMUSCULAR; INTRAVENOUS at 08:44

## 2022-07-14 RX ADMIN — PROPOFOL 150 MG: 10 INJECTION, EMULSION INTRAVENOUS at 08:31

## 2022-07-14 RX ADMIN — CEFAZOLIN SODIUM 2 G: 2 INJECTION, SOLUTION INTRAVENOUS at 08:28

## 2022-07-14 RX ADMIN — DEXAMETHASONE SODIUM PHOSPHATE 8 MG: 4 INJECTION, SOLUTION INTRA-ARTICULAR; INTRALESIONAL; INTRAMUSCULAR; INTRAVENOUS; SOFT TISSUE at 08:44

## 2022-07-14 RX ADMIN — BUPIVACAINE HYDROCHLORIDE AND EPINEPHRINE BITARTRATE 30 ML: 5; .005 INJECTION, SOLUTION EPIDURAL; INTRACAUDAL; PERINEURAL at 07:49

## 2022-07-14 RX ADMIN — HYDROMORPHONE HYDROCHLORIDE 0.5 MG: 1 INJECTION, SOLUTION INTRAMUSCULAR; INTRAVENOUS; SUBCUTANEOUS at 10:16

## 2022-07-14 RX ADMIN — MEPERIDINE HYDROCHLORIDE 12.5 MG: 25 INJECTION INTRAMUSCULAR; INTRAVENOUS; SUBCUTANEOUS at 10:19

## 2022-07-14 RX ADMIN — Medication 30 MG: at 09:01

## 2022-07-14 RX ADMIN — HYDROCODONE BITARTRATE AND ACETAMINOPHEN 1 TABLET: 5; 325 TABLET ORAL at 10:37

## 2022-07-14 RX ADMIN — SODIUM CHLORIDE, POTASSIUM CHLORIDE, SODIUM LACTATE AND CALCIUM CHLORIDE: 600; 310; 30; 20 INJECTION, SOLUTION INTRAVENOUS at 08:28

## 2022-07-14 RX ADMIN — ACETAMINOPHEN 1000 MG: 500 TABLET ORAL at 07:25

## 2022-07-14 RX ADMIN — LIDOCAINE HYDROCHLORIDE 50 MG: 10 INJECTION, SOLUTION INFILTRATION; PERINEURAL at 08:31

## 2022-07-14 NOTE — ADDENDUM NOTE
Addendum  created 07/14/22 1120 by Divina Bagley MD    Order list changed, Pharmacy for encounter modified

## 2022-07-14 NOTE — ANESTHESIA POSTPROCEDURE EVALUATION
Patient: Shanell Rollins    Procedure Summary     Date: 07/14/22 Room / Location: [facility] OR 03 / [facility] MAIN OR    Anesthesia Start: 0828 Anesthesia Stop: 0938    Procedure: OPEN REDUCTION INTERNAL FIXATION OF LEFT DISTAL RADIUS FRACTURE (Left Wrist) Diagnosis:       Closed fracture of distal end of left radius, unspecified fracture morphology, initial encounter      (Closed fracture of distal end of left radius, unspecified fracture morphology, initial encounter [S52.502A])    Surgeons: Cole Velásquez MD Provider: Joseph Ashley MD    Anesthesia Type: general, general with block ASA Status: 2          Anesthesia Type: general, general with block    Vitals  Vitals Value Taken Time   /88 07/14/22 1002   Temp 36.8 °C (98.2 °F) 07/14/22 0940   Pulse 92 07/14/22 1005   Resp 16 07/14/22 0950   SpO2 98 % 07/14/22 1005   Vitals shown include unvalidated device data.        Post Anesthesia Care and Evaluation    Patient location during evaluation: bedside  Patient participation: complete - patient participated  Level of consciousness: awake  Pain score: 0  Pain management: adequate    Airway patency: patent  Anesthetic complications: No anesthetic complications  PONV Status: none  Cardiovascular status: acceptable and stable  Respiratory status: acceptable and room air  Hydration status: acceptable    Comments: An Anesthesiologist personally participated in the most demanding procedures (including induction and emergence if applicable) in the anesthesia plan, monitored the course of anesthesia administration at frequent intervals and remained physically present and available for immediate diagnosis and treatment of emergencies.

## 2022-07-14 NOTE — ANESTHESIA PROCEDURE NOTES
Peripheral Block      Patient reassessed immediately prior to procedure    Patient location during procedure: pre-op  Start time: 7/14/2022 7:44 AM  Stop time: 7/14/2022 7:47 AM  Reason for block: at surgeon's request and post-op pain management  Performed by  Anesthesiologist: Joseph Ashley MD  Preanesthetic Checklist  Completed: patient identified, IV checked, site marked, risks and benefits discussed, surgical consent, monitors and equipment checked, pre-op evaluation and timeout performed  Prep:  Pt Position: supine  Sterile barriers:alcohol skin prep, cap, partial drape, washed/disinfected hands, gloves, mask and sterile barriers  Prep: ChloraPrep  Patient monitoring: blood pressure monitoring, continuous pulse oximetry and EKG  Procedure    Sedation: yes  Performed under: local infiltration  Guidance:ultrasound guided    ULTRASOUND INTERPRETATION.  Using ultrasound guidance a 22 G gauge needle was placed in close proximity to the brachial plexus nerve, at which point, under ultrasound guidance anesthetic was injected in the area of the nerve and spread of the anesthesia was seen on ultrasound in close proximity thereto.  There were no abnormalities seen on ultrasound; a digital image was taken; and the patient tolerated the procedure with no complications. Images:still images obtained, printed/placed on chart    Laterality:left  Block Type:supraclavicular  Injection Technique:single-shot  Needle Type:echogenic  Needle Gauge:22 G (2in)  Resistance on Injection: none    Medications Used: bupivacaine-EPINEPHrine PF (MARCAINE w/EPI) 0.5% -1:193525 injection, 30 mL      Post Assessment  Injection Assessment: negative aspiration for heme, no paresthesia on injection and incremental injection  Patient Tolerance:comfortable throughout block  Complications:no  Additional Notes  The block or continuous infusion is requested by the referring physician for management of postoperative pain, or pain related to a  procedure. Ultrasound guidance (deemed medically necessary). Painless injection, pt was awake and conversant during the procedure without complications. Needle and surrounding structures visualized throughout procedure. No adverse reactions or complications seen during this period. Post-procedure image showed no signs of complication, and anatomy was consistent with an uncomplicated nerve blockade.

## 2022-07-14 NOTE — DISCHARGE INSTRUCTIONS
Orthopedic instructions: No lifting with the operative arm.  Keep your splint on, intact, clean, dry at all times.  Ice and elevate help reduce pain and swelling.  Perform finger and elbow range of motion exercises to help prevent stiffness.  Use your sling as needed.  Monitor for increasing pain, drainage through the splint, fevers, chills.  Call the office with any concerns.  Follow-up in 2 weeks for reevaluation.   DISCHARGE INSTRUCTIONS  ORTHOPEDICS      For your surgery you had:  General anesthesia (you may have a sore throat for the first 24 hours)  IV sedation.  Local anesthesia  Monitored anesthesia care  You received a medicated patch for nausea prevention today (behind the ear). It is recommended that you remove it 24-48 hours post-operatively. It must be removed within 72 hours.   You received an anesthesia medication today that can cause hormonal forms of birth control to be ineffective. You should use a different form of birth control (to prevent pregnancy) for 7 days.   You may experience dizziness, drowsiness, or light-headedness for several hours following surgery  Do not stay alone today or tonight.  Limit your activity for 24 hours.  Resume your diet slowly.  Follow whatever special dietary instructions you may have been given by the doctor.  You should not drive or operate machinery or drink alcohol for 24 hours or while you are taking pain medication.  You should not sign any legally binding documents.  If you had an axillary or regional block, you will not have control of the involved limb for up to 12 hours.  Protect the arm with a sling or follow your physician's specific instructions.  You may remove dressing:  [] in 24 hours  [] in 48 hours  [] Other:    You may shower or bathe:    Sleep with the injured part elevated on a pillow.  Medications per physician's instructions as indicated on Discharge Medication Information Sheet.  Follow verbal instructions of your doctor.  Carry the upper arm  in a sling so that the hand and wrist are above the level of the heart.  Sit with the lower leg propped up on a footstool or chair with pillows.  Exercise fingers or toes for 10 minutes every hour while awake. Ice bag to injured area for 72 hours.  Apply 20 minutes on - 20 minutes off.  Never place ice directly on skin or cast.    Avoid getting cast or dressing wet.  The Cold Therapy System can help reduce swelling and decrease pain.  Utilize device for 30-60 minutes per session, with 30-60 minute breaks in between sessions.  It is recommended to use, as directed, for the first 72 hours after surgery until bedtime.  After 72 hours, continue using the device as needed until your follow-up appointment with your physician.  Never place directly on skin.  Please refer to the instruction sheet given.  In addition to these instructions, follow the discharge instructions on postoperative arthroscopic surgery.  SPECIAL INSTRUCTIONS:           Last dose of pain medication was given at: 1036   NOTIFY THE PHYSICIAN IF YOU EXPERIENCE:  Numbness of fingers or toes.  Inability to move fingers or toes.  Extreme coldness, paleness or blue dis-coloration of fingers or toes.  Excessive swelling of affected surgical site or swelling that causes the cast to rub or cut into skin.  Pain unrelieved by pain medication  Nausea/vomiting not relieved by prescribed medication  Unable to urinate in 6 hours after surgery  Temperature greater than 101 degree Fahrenheit or chills  If unable to reach your doctor, please go to the closest emergency room  You should see Dr. Velásquez  for follow-up care   on July 27th at 0830  .   Phone number: [phone]

## 2022-07-14 NOTE — INTERVAL H&P NOTE
H&P reviewed. The patient was examined and there are no changes to the H&P.     I have seen and examined the above patient and agree with the plan.     Cardiac: Intact peripheral pulses  Pulmonary: Nonlabored respirations on room air.   Left upper extremity: Splint intact.  Distal neurovascular intact.        We reviewed the risks, benefits, indications, and alternatives to open reduction internal fixation of the left distal radius fracture.  The patient elected to proceed and consent was obtained.      Electronically signed by Cole Velásquez MD, 07/14/22, 6:44 AM EDT.

## 2022-07-14 NOTE — ANESTHESIA PREPROCEDURE EVALUATION
Anesthesia Evaluation     Patient summary reviewed and Nursing notes reviewed   no history of anesthetic complications:  NPO Solid Status: > 8 hours  NPO Liquid Status: > 2 hours           Airway   Mallampati: II  TM distance: >3 FB  Neck ROM: full  No difficulty expected  Dental    (+) edentulous    Pulmonary - normal exam    breath sounds clear to auscultation  (+) a smoker Former,   Cardiovascular - negative cardio ROS and normal exam  Exercise tolerance: good (4-7 METS)    Rhythm: regular  Rate: normal        Neuro/Psych  (+) TIA,    GI/Hepatic/Renal/Endo - negative ROS     Musculoskeletal (-) negative ROS    Abdominal    Substance History - negative use     OB/GYN negative ob/gyn ROS         Other - negative ROS                       Anesthesia Plan    ASA 2     general and general with block     (Patient understands anesthesia not responsible for dental damage.)  intravenous induction     Anesthetic plan, risks, benefits, and alternatives have been provided, discussed and informed consent has been obtained with: patient.  Use of blood products discussed with patient .   Plan discussed with CRNA.        CODE STATUS:

## 2022-07-14 NOTE — OP NOTE
WRIST OPEN REDUCTION INTERNAL FIXATION  Procedure Report    Patient Name:  Shanell Rollins  YOB: 1980    Date of Surgery:  7/14/2022     Indications: Shanell is a 42-year-old female who sustained a fall with a left distal radius fracture.  We discussed treatment options.  We discussed nonoperative management with casting.  We discussed surgical management with open reduction internal fixation of the left distal radius fracture.  We discussed benefits of surgery including improved alignment, fracture stability, and a decreased period of immobilization.  We discussed surgical risks including bleeding, infection, damage to nerves and blood vessels, hardware complications, nonunion, malunion, persistent pain, stiffness, posttraumatic arthritis, anesthesia risk including mortality, DVT, and need for additional procedures.  The patient elected to proceed and consent was obtained.    Pre-op Diagnosis:   Closed fracture of distal end of left radius, unspecified fracture morphology, initial encounter [S52.502A]       Post-Op Diagnosis Codes:     * Closed fracture of distal end of left radius, unspecified fracture morphology, initial encounter [S52.502A]    Procedure/CPT® Codes:      Procedure(s):  OPEN REDUCTION INTERNAL FIXATION OF LEFT DISTAL RADIUS FRACTURE, 2 part extra-articular fracture    Staff:  Surgeon(s):  Cole Velásquez MD    Assistant: Desiree Ware PA-C    Anesthesia: General with Block    Estimated Blood Loss: 30 mL    Implants:    Implant Name Type Inv. Item Serial No.  Lot No. LRB No. Used Action   PLT VOLR D/R XSHT 10H LT STRL - [id number] Implant PLT VOLR D/R XSHT 10H LT STRL  [employer]  Left 1 Implanted   SCRW NL BONE FUL/THRD T8 2.7X16MM - [id number] Implant SCRW NL BONE FUL/THRD T8 2.7X16MM  [employer]  Left 1 Implanted   SCRW LK BONE VARIAX FUL/THRD V8 2.4X18MM - [id number] Implant SCRW LK BONE VARIAX FUL/THRD V8 2.4X18MM  [employer]  Left 1 Implanted   SCRW LK  BONE VARIAX FUL/THRD V8 2.7X20MM - [id number] Implant SCRW LK BONE VARIAX FUL/THRD V8 2.7X20MM  [employer]  Left 2 Implanted   SCRW LK BONE VARIAX FUL/THRD V8 2.4X22MM - [id number] Implant SCRW LK BONE VARIAX FUL/THRD V8 2.4X22MM  [employer]  Left 1 Implanted   SCRW LK BONE FUL/THRD T8 2.7X14MM - [id number] Implant SCRW LK BONE FUL/THRD T8 2.7X14MM  [employer]  Left 1 Implanted   SCRW LK BONE FUL/THRD T8 2.7X16MM - [id number] Implant SCRW LK BONE FUL/THRD T8 2.7X16MM  [employer]  Left 1 Implanted       Specimen:          None        Findings: Displaced extra-articular left distal radius fracture    Complications: None    Description of Procedure: The patient was met in the preoperative holding area and the operative extremity was marked.  The patient underwent a preoperative peripheral nerve block with the anesthesia team.  The patient was transported to the operating room and laid supine on the operating room table.  General anesthesia was induced without complication.  2 g of preoperative Ancef were administered.  An upper arm tourniquet was applied.  The left upper extremity was prepped and draped in the usual sterile fashion.  A timeout was taken to ensure the appropriate patient, procedure, and procedural site.  All were in agreement.  The left upper extremity was exsanguinated and the tourniquet was inflated to 250 mmHg.  I made a 5 cm longitudinal incision over the FCR tendon sheath for a standard FCR-based approach to the distal radius.  Sharp dissection was carried down through the skin and subcutaneous tissues.  The FCR tendon sheath was identified.  This was split longitudinally in line with the incision.  The FCR tendon was retracted ulnarly.  The floor of the FCR tendon sheath was sharply incised.  The underlying FPL muscle belly was identified and retracted ulnarly.  Deep retractors were inserted.  The pronator quadratus was identified.  This was sharply elevated off the distal radius.  A soft  tissue elevator was slid proximally in anticipation of plate placement.  The fracture site was identified.  The fracture site was debrided of soft tissue and hematoma.  The fracture site was irrigated.  A Dunellen elevator and manual traction was used to reduce the fracture.  The fracture was temporarily held in place with a K wire through the radial styloid.  C-arm confirmed my reduction.  I then selected a Finco variax intermediate right distal radius plate.  This was positioned on the volar surface of the distal radius and temporarily held in place with 2 K wires.  Plate positioning and reduction were confirmed with C-arm fluoroscopy.  I was satisfied with both.  I then began my fixation.  I placed 1 bicortical nonlocking screw in the oblong hole of the plate.  This was partially inserted.  I then moved distally and placed two unicortical locking screws in the central 2 holes of the distal row of the plate.  I then applied a volar force across the distal radius and secured the proximal end of the plate down to bone by fully tightening the previously inserted nonlocking screw in the oblong hole.  This reduced the plate to bone nicely.  C-arm was brought in and confirmed appropriate hardware positioning and maintained reduction with this maneuver.  I moved distally and placed 2 additional unicortical locking screws in the distal row of the plate. The distal screws were final tightened.  I moved proximally.  I placed 2 bicortical locking screws in the most proximal holes of the plate.  K wires and deep retractors were removed.  C-arm fluoroscopy was used to confirm reduction and hardware positioning.  Final images were obtained in the AP and lateral planes including a 20 degree elevated view.  No intra-articular screw penetration was noted.  No hardware complications were noted overall.  Reduction was satisfactory.  The wound was then thoroughly irrigated with normal saline.  The wound was packed with a sponge and  the tourniquet was released.  Hemostasis was obtained with the Bovie electrocautery.  No arterial bleeding was identified.  The patient had a palpable radial pulse and well-perfused hand.  Subcutaneous tissues were closed with 3-0 Vicryl.  Skin was closed with 4-0 nylon.  The wound was dressed with Xeroform and 4 x 4's.  A well-padded sugar tong splint was applied.  The patient awoke from anesthesia without complication and was transferred to the recovery room in stable condition.  All surgical counts were correct.    Assistant: Desiree Ware PA-C  was responsible for performing the following activities: Retraction, Suction, Irrigation, Suturing, Closing and Placing Dressing and their skilled assistance was necessary for the success of this case.    Cole Velásquez MD     Date: 7/14/2022  Time: 09:45 EDT

## 2022-07-25 ENCOUNTER — TELEPHONE (OUTPATIENT)
Dept: ORTHOPEDIC SURGERY | Facility: CLINIC | Age: 42
End: 2022-07-25

## 2022-07-25 DIAGNOSIS — S52.502A CLOSED FRACTURE OF DISTAL END OF LEFT RADIUS, UNSPECIFIED FRACTURE MORPHOLOGY, INITIAL ENCOUNTER: ICD-10-CM

## 2022-07-25 RX ORDER — HYDROCODONE BITARTRATE AND ACETAMINOPHEN 7.5; 325 MG/1; MG/1
1 TABLET ORAL EVERY 4 HOURS PRN
Qty: 40 TABLET | Refills: 0 | Status: SHIPPED | OUTPATIENT
Start: 2022-07-25 | End: 2022-09-30

## 2022-07-25 NOTE — TELEPHONE ENCOUNTER
PATIENT REQUESTING PAIN MEDICATION REFILL. [facility] [address] [place] AND [facility].

## 2022-07-25 NOTE — TELEPHONE ENCOUNTER
TRIED SEVERAL TIMES TO GET HOLD OF PATIENT TO INFORM HER THAT THE PAPERWORK IS DONE & READY FOR P/U AT THE .

## 2022-07-26 NOTE — PROGRESS NOTES
"Chief Complaint  Follow-up of the Left Wrist    Subjective          Shanell Rollins presents to [facility] for   History of Present Illness    Shanell Rollins presents today for follow-up of left wrist.  Patient is 2 weeks postop left wrist ORIF performed by Dr. Velásquez on 2022. Today, patient states she is doing okay.  She has remained in her splint following surgery.  She reports a throbbing soreness to her wrist.  She has been taking hydrocodone every 6 hours.  She reports stiffness after splint removal.  She denies fever or chills.  Denies redness or drainage from her incision.  Denies new injuries.  Denies numbness or tingling.      Allergies   Allergen Reactions   • Oxycodone Itching     AND BECOMES ANXIOUS AND MEAN        Social History     Socioeconomic History   • Marital status:    Tobacco Use   • Smoking status: Former Smoker     Quit date: 2021     Years since quittin.7   • Smokeless tobacco: Never Used   Vaping Use   • Vaping Use: Every day   • Substances: Nicotine, Flavoring   • Devices: Refillable tank   Substance and Sexual Activity   • Alcohol use: Not Currently   • Drug use: Never   • Sexual activity: Defer        I reviewed the patient's chief complaint, history of present illness, review of systems, past medical history, surgical history, family history, social history, medications, and allergy list.     REVIEW OF SYSTEMS    Constitutional: Denies fevers, chills, weight loss  Cardiovascular: Denies chest pain, shortness of breath  Skin: Denies rashes, acute skin changes  Neurologic: Denies headache, loss of consciousness  MSK: Left wrist pain      Objective   Vital Signs:   Pulse 74   Ht 160 cm (63\")   Wt 66.3 kg (146 lb 3.2 oz)   SpO2 95%   BMI 25.90 kg/m²     Body mass index is 25.9 kg/m².    Physical Exam    General: Alert. No acute distress.   Left upper extremity: Splint removed today.  Sutures removed today without complications.  " Incision is healing well.  No active drainage or redness noted.  No concerning signs for infection.  Minimal swelling.  Forearm soft.  Diffuse resolving bruising to the forearm.  Stiffness with finger flexion.  Full finger extension.  Demonstrates active wrist flexion and extension with associated stiffness.  Less than 2-second capillary refill.  Sensation intact to median, radial, and ulnar nerve distributions.  Palpable radial pulse.      Procedures    Imaging Results (Most Recent)     Procedure Component Value Units Date/Time    XR Wrist 2 View Left [[id number]] Resulted: 07/27/22 1030     Updated: 07/27/22 1032    Narrative:      Indications: Follow-up left wrist fracture    Views: AP and lateral left wrist    Findings: Left distal radius fracture is seen.  Fracture is well aligned.    Fracture fixation with plate and screw construct remains in place compared   to intraoperative films.  No evidence of hardware complications or   loosening noted.  No additional fractures noted.    Comparative Data: Comparative data found and reviewed today.                   Assessment and Plan {CC Problem List  Visit Diagnosis   ROS  Review (Popup)  Health Maintenance  Quality  BestPractice  Medications  SmartSets  SnapShot Encounters  Media :23}   Diagnoses and all orders for this visit:    1. Closed fracture of distal end of left radius with routine healing, unspecified fracture morphology, subsequent encounter (Primary)  -     XR Wrist 2 View Left  -     Ambulatory Referral to Occupational Therapy         Shanell LING Ayo presents today 2 weeks postop left wrist open reduction internal fixation performed by Dr. Velásquez on 7/14/2022.  X-rays were reviewed with the patient today. Sutures removed today without complications.  Incision is well-healing.  No active drainage or redness noted.  No concerning signs for infection.  Patient denies fever or chills. Incision site care was reviewed today. Patient instructed not  to soak or submerge incision. Do not apply any lotions, creams, or ointments to the incision at this time.  We discussed concerning signs and symptoms regarding the incision site. We discussed applying gauze over the incision while wearing wrist brace to avoid irritation.  Patient understood and agreed.  Patient was given a comfort form wrist brace today.  She is instructed to wear this wrist brace at all times for the next 2 to 3 weeks aside from hygiene and working on wrist and finger range of motion exercises.  Formal occupational therapy was ordered today.  We discussed the importance of working on making a tight fist and performing gentle range of motion exercises at home.  Patient is limited to a 1 pound lifting restriction at this time.  Work note was written today.  We discussed the importance of weaning from narcotic medications.  Patient understood and agreed.  Patient will follow up in 4 weeks for reevaluation.  We will obtain new x-rays of the left wrist at next visit.      Call or return if symptoms worsen or patient has any concerns.   Will obtain X-Rays of left wrist at next visit.         Follow Up   Return in about 4 weeks (around 8/24/2022).  Patient was given instructions and counseling regarding her condition or for health maintenance advice. Please see specific information pulled into the AVS if appropriate.     Desiree Ware PA-C  07/27/22  10:32 EDT

## 2022-07-27 ENCOUNTER — OFFICE VISIT (OUTPATIENT)
Dept: ORTHOPEDIC SURGERY | Facility: CLINIC | Age: 42
End: 2022-07-27

## 2022-07-27 VITALS — BODY MASS INDEX: 25.91 KG/M2 | HEART RATE: 74 BPM | HEIGHT: 63 IN | OXYGEN SATURATION: 95 % | WEIGHT: 146.2 LBS

## 2022-07-27 DIAGNOSIS — S52.502D CLOSED FRACTURE OF DISTAL END OF LEFT RADIUS WITH ROUTINE HEALING, UNSPECIFIED FRACTURE MORPHOLOGY, SUBSEQUENT ENCOUNTER: Primary | ICD-10-CM

## 2022-07-27 PROCEDURE — 99024 POSTOP FOLLOW-UP VISIT: CPT | Performed by: PHYSICIAN ASSISTANT

## 2022-08-09 ENCOUNTER — TREATMENT (OUTPATIENT)
Dept: PHYSICAL THERAPY | Facility: CLINIC | Age: 42
End: 2022-08-09

## 2022-08-09 DIAGNOSIS — M25.532 ACUTE PAIN OF LEFT WRIST: ICD-10-CM

## 2022-08-09 DIAGNOSIS — M25.632 WRIST STIFFNESS, LEFT: ICD-10-CM

## 2022-08-09 DIAGNOSIS — S62.102S LEFT WRIST FRACTURE, SEQUELA: Primary | ICD-10-CM

## 2022-08-09 PROCEDURE — 97165 OT EVAL LOW COMPLEX 30 MIN: CPT | Performed by: OCCUPATIONAL THERAPIST

## 2022-08-09 NOTE — PROGRESS NOTES
"Occupational Therapy Initial Evaluation and Plan of Care      Patient: Shanell Rollins   : 1980  Diagnosis/ICD-10 Code:  Left wrist fracture, sequela [S62.102S]  Referring practitioner: Desiree Ware PA-C  Date of Initial Visit: 2022  Today's Date: 2022  Patient seen for 1 sessions               Subjective Evaluation    History of Present Illness  Date of onset: 2022  Date of surgery: 2022  Mechanism of injury: Patient is a 42 year old RHD female.  She reports that while roller skating she fell and injured her L wrist.  Xray revealed a distal radius fracture.  On 22 she underwent ORIF with plate and screws.  She is now 3 1/2 weeks s/p surgery.  She is referred to therapy for evaluation and treatment.  Her complaints include pain, stiffness and decreased functional use of the L arm and hand.  She reports that her R shoulder has been sore.    PMHx includes: B shoulder pain, TIA       Patient Occupation:  at [employer]. RTW date 22   Precautions and Work Restrictions: off workPain  Current pain ratin  At best pain ratin  At worst pain ratin  Quality: dull ache, sharp and discomfort  Relieving factors: medications and rest  Progression: improved    Social Support  Lives with: family.    Hand dominance: right    Diagnostic Tests  X-ray: abnormal    Patient Goals  Patient goals for therapy: decreased pain, increased motion, increased strength, independence with ADLs/IADLs, return to sport/leisure activities, return to work and decreased edema  Patient goal: \"I want to be able to open doors and get back to normal life.\"         Quick Dash 39/55 60-79% functional limitation    Objective          Observations     Additional Wrist/Hand Observation Details  Patient presents with a prefab wrist support on her L wrist    Tenderness     Additional Tenderness Details  Moderate along incision    Neurological Testing     Sensation     Wrist/Hand   Left   Intact: light " touch    Active Range of Motion     Left Wrist   Wrist flexion: 15 degrees   Wrist extension: 20 degrees   Radial deviation: 5 degrees   Ulnar deviation: 10 degrees     Additional Active Range of Motion Details  Total active wrist motion 50 degrees   Forearm pronation 40 supination 45  Shoulder and elbow AROM WNL's  Finger ROM is WNLs.  Lacks 3cm to distal palmar crease in thumb flexion.  Able to oppose to small finger tip    Strength/Myotome Testing     Additional Strength Details  Deferred    Swelling     Left Wrist/Hand     Additional Swelling Details  Mild-mod in L wrist          Assessment & Plan     Assessment  Impairments: abnormal coordination, abnormal muscle tone, abnormal or restricted ROM, activity intolerance, impaired physical strength, lacks appropriate home exercise program and pain with function  Functional Limitations: carrying objects, lifting, sleeping, pulling, pushing, uncomfortable because of pain, reaching behind back, reaching overhead and unable to perform repetitive tasksPrognosis: good    Goals  Plan Goals: Wrist Pain  LTG 1  12 weeks:  Decrease pain to 1/10 to improve sleep and performance of ADLs   Status:  New  STG 1  6 weeks:  Decrease pain to 4/10  Status:  New    2.  Decreased wrist AROM  LTG 2  12 weeks:  Increase wrist ROM to 120 degrees of total active motion to improve ability to grasp  Status:  New  STG 2  6 weeks:  Increase wrist ROM to 80 degrees of total active motion  Status:  New    3.  Decreased strength  LTG 3  12 weeks:  Increase strength to 5/5 MMT and  strength to norms for age group to improve ability to grasp, lift, carry and handle objects  Status:  New  LTG 3  6 weeks:  Good tolerance to strength testing  Status:  New    4.  Decreased functional use of the wrist and hand  LTG 4  12 weeks:  Improved function score to 19/55 or better  Status:  New  LTG 4  6 weeks:  Improved function score to 27 or better  Status:  New      Plan  Planned modality interventions:  iontophoresis, contrast bath immersion, cryotherapy, electrical stimulation/Russian stimulation, hydrotherapy, TENS, thermotherapy (hydrocollator packs), thermotherapy (paraffin bath) and ultrasound  Planned therapy interventions: ADL retraining, balance/weight-bearing training, body mechanics training, compression, fine motor coordination training, flexibility, manual therapy, neuromuscular re-education, motor coordination training, orthotic fitting/training, postural training, soft tissue mobilization, spinal/joint mobilization, strengthening, stretching, IADL retraining, home exercise program and functional ROM exercises  Frequency: 2x week  Duration in weeks: 12  Treatment plan discussed with: patient  Plan details: Reviewed use of modalities, instructed in shoulder, forearm and wrist AROM exercises        Patient will be seen for skilled OT services    Visit Diagnoses:    ICD-10-CM ICD-9-CM   1. Left wrist fracture, sequela  S62.102S 905.2   2. Acute pain of left wrist  M25.532 719.43   3. Wrist stiffness, left  M25.632 719.53       Timed:  Manual Therapy:                 0     mins  92695  Therapeutic Exercise:      0     mins  80855     Neuromuscular reeducation       0     mins 73676  Therapeutic Activity              0     mins  26756  Ultrasound:                  0     mins  41052     Untimed:  Low eval:                       40     mins  97547  Mod eval                        0     mins  67947  Electrical Stimulation:    0     mins  01050 ( );  Fuidotherapy     0     mins  57199      Timed Treatment:   0   mins   Total Treatment:     40   mins    OT SIGNATURE: Atif Ann OT, CHT  Electronic Signature  [place] license #: [id number]      Initial Certification  Certification Period: 8/9/2022 thru 11/6/2022  I certify that the therapy services are furnished while this patient is under my care.  The services outlined above are required by this patient, and will be reviewed every 90 days.     PHYSICIAN: Chaz  DOM Ramírez   NPI: [id number]     DATE:       Please sign and return via fax to [phone].. Thank you, [facility].

## 2022-08-11 ENCOUNTER — TREATMENT (OUTPATIENT)
Dept: PHYSICAL THERAPY | Facility: CLINIC | Age: 42
End: 2022-08-11

## 2022-08-11 DIAGNOSIS — M25.532 ACUTE PAIN OF LEFT WRIST: ICD-10-CM

## 2022-08-11 DIAGNOSIS — S62.102S LEFT WRIST FRACTURE, SEQUELA: Primary | ICD-10-CM

## 2022-08-11 DIAGNOSIS — M25.632 WRIST STIFFNESS, LEFT: ICD-10-CM

## 2022-08-11 PROCEDURE — 97110 THERAPEUTIC EXERCISES: CPT | Performed by: OCCUPATIONAL THERAPIST

## 2022-08-11 PROCEDURE — 97140 MANUAL THERAPY 1/> REGIONS: CPT | Performed by: OCCUPATIONAL THERAPIST

## 2022-08-11 NOTE — PROGRESS NOTES
"Occupational Therapy Daily Treatment Note      Patient: Shanell Rollins   : 1980  Referring practitioner: Desiree Ware PA-C  Date of Initial Visit: Type: THERAPY  Noted: 2022  Today's Date: 2022  Patient seen for 2 sessions           Subjective Evaluation    History of Present Illness  Date of onset: 2022  Date of surgery: 2022  Mechanism of injury: ORIF with plate and screws.      PMHx includes: B shoulder pain, TIA     Subjective comment: No significant changes noted.  Patient Occupation:  at [email or web address]. RTW date 22   Precautions and Work Restrictions: off workPain  Current pain ratin    Social Support  Lives with: family.    Hand dominance: right    Patient Goals  Patient goal: \"I want to be able to open doors and get back to normal life.\"           Objective   See Exercise, Manual, and Modality Logs for complete treatment.       Assessment & Plan     Assessment    Assessment details: Good tolerance to exercises.Shoulder discomfort has resolved.        Visit Diagnoses:    ICD-10-CM ICD-9-CM   1. Left wrist fracture, sequela  S62.102S 905.2   2. Acute pain of left wrist  M25.532 719.43   3. Wrist stiffness, left  M25.632 719.53       Progress per Plan of Care           Timed:  Manual Therapy:                 10     mins  06532  Therapeutic Exercise:      15     mins  08058     Neuromuscular reeducation       0     mins 87694  Therapeutic Activity              0     mins  78132  Ultrasound:                  0     mins  79420     Untimed:  Electrical Stimulation:    0     mins  26711 ( );  Fluidotherapy      0     mins  95319    Timed Treatment:   25   mins   Total Treatment:     25   mins    Atif Ann OT, BABATUNDET  Occupational Therapist    Electronically signed      [place] license #: [id number]  "

## 2022-08-16 ENCOUNTER — TREATMENT (OUTPATIENT)
Dept: PHYSICAL THERAPY | Facility: CLINIC | Age: 42
End: 2022-08-16

## 2022-08-16 DIAGNOSIS — M25.632 WRIST STIFFNESS, LEFT: ICD-10-CM

## 2022-08-16 DIAGNOSIS — S62.102S LEFT WRIST FRACTURE, SEQUELA: Primary | ICD-10-CM

## 2022-08-16 DIAGNOSIS — M25.532 ACUTE PAIN OF LEFT WRIST: ICD-10-CM

## 2022-08-16 PROCEDURE — 97110 THERAPEUTIC EXERCISES: CPT | Performed by: OCCUPATIONAL THERAPIST

## 2022-08-16 PROCEDURE — 97140 MANUAL THERAPY 1/> REGIONS: CPT | Performed by: OCCUPATIONAL THERAPIST

## 2022-08-16 NOTE — PROGRESS NOTES
"Occupational Therapy Daily Treatment Note      Patient: Shanell Rollins   : 1980  Referring practitioner: Desiree Ware PA-C  Date of Initial Visit: Type: THERAPY  Noted: 2022  Today's Date: 2022  Patient seen for 3 sessions           Subjective Evaluation    History of Present Illness  Date of onset: 2022  Date of surgery: 2022  Mechanism of injury: ORIF with plate and screws.      PMHx includes: B shoulder pain, TIA     Subjective comment: \"Doing good\"  \"Alleve helps with pain\"  Patient Occupation:  at [employer]. RTW date 22   Precautions and Work Restrictions: off workPain  Current pain ratin    Social Support  Lives with: family.    Hand dominance: right    Patient Goals  Patient goal: \"I want to be able to open doors and get back to normal life.\"           Objective   See Exercise, Manual, and Modality Logs for complete treatment.       Assessment/Plan    Visit Diagnoses:    ICD-10-CM ICD-9-CM   1. Left wrist fracture, sequela  S62.102S 905.2   2. Acute pain of left wrist  M25.532 719.43   3. Wrist stiffness, left  M25.632 719.53       Progress per Plan of Care           Timed:  Manual Therapy:                 10     mins  96507  Therapeutic Exercise:      15     mins  38675     Neuromuscular reeducation       0     mins 87601  Therapeutic Activity              0     mins  16759  Ultrasound:                  0     mins  86099     Untimed:  Electrical Stimulation:    0     mins  65084 ( );  Fluidotherapy      0     mins  71026    Timed Treatment:   25   mins   Total Treatment:     25   mins    Atif Ann OT, BABATUNDET  Occupational Therapist    Electronically signed      [place] license #: [id number]  "

## 2022-08-18 ENCOUNTER — TREATMENT (OUTPATIENT)
Dept: PHYSICAL THERAPY | Facility: CLINIC | Age: 42
End: 2022-08-18

## 2022-08-18 DIAGNOSIS — M25.632 WRIST STIFFNESS, LEFT: ICD-10-CM

## 2022-08-18 DIAGNOSIS — M25.532 ACUTE PAIN OF LEFT WRIST: ICD-10-CM

## 2022-08-18 DIAGNOSIS — S62.102S LEFT WRIST FRACTURE, SEQUELA: Primary | ICD-10-CM

## 2022-08-18 PROCEDURE — 97110 THERAPEUTIC EXERCISES: CPT | Performed by: OCCUPATIONAL THERAPIST

## 2022-08-18 PROCEDURE — 97140 MANUAL THERAPY 1/> REGIONS: CPT | Performed by: OCCUPATIONAL THERAPIST

## 2022-08-18 NOTE — PROGRESS NOTES
"Occupational Therapy Daily Treatment Note      Patient: Shanell Rollins   : 1980  Referring practitioner: Desiree Ware PA-C  Date of Initial Visit: Type: THERAPY  Noted: 2022  Today's Date: 2022  Patient seen for 4 sessions           Subjective Evaluation    History of Present Illness  Date of onset: 2022  Date of surgery: 2022  Mechanism of injury: ORIF with plate and screws.      PMHx includes: B shoulder pain, TIA     Subjective comment: Reports gradual improvement.  Patient Occupation:  at [email or web address]. RTW date 22   Precautions and Work Restrictions: off workPain  Current pain rating: 3    Social Support  Lives with: family.    Hand dominance: right    Patient Goals  Patient goal: \"I want to be able to open doors and get back to normal life.\"           Objective   See Exercise, Manual, and Modality Logs for complete treatment.       Assessment/Plan    Visit Diagnoses:    ICD-10-CM ICD-9-CM   1. Left wrist fracture, sequela  S62.102S 905.2   2. Acute pain of left wrist  M25.532 719.43   3. Wrist stiffness, left  M25.632 719.53       Progress per Plan of Care           Timed:  Manual Therapy:                 10     mins  40957  Therapeutic Exercise:      15     mins  41241     Neuromuscular reeducation       0     mins 36830  Therapeutic Activity              0     mins  95151  Ultrasound:                  0     mins  87088     Untimed:  Electrical Stimulation:    0     mins  89937 ( );  Fluidotherapy      0     mins  95966    Timed Treatment:   25   mins   Total Treatment:     25   mins    Atif Ann OT, BABATUNDET  Occupational Therapist    Electronically signed      [place] license #: [id number]  "

## 2022-08-23 ENCOUNTER — TREATMENT (OUTPATIENT)
Dept: PHYSICAL THERAPY | Facility: CLINIC | Age: 42
End: 2022-08-23

## 2022-08-23 DIAGNOSIS — M25.532 ACUTE PAIN OF LEFT WRIST: ICD-10-CM

## 2022-08-23 DIAGNOSIS — M25.632 WRIST STIFFNESS, LEFT: ICD-10-CM

## 2022-08-23 DIAGNOSIS — S62.102S LEFT WRIST FRACTURE, SEQUELA: Primary | ICD-10-CM

## 2022-08-23 PROCEDURE — 97110 THERAPEUTIC EXERCISES: CPT | Performed by: OCCUPATIONAL THERAPIST

## 2022-08-23 PROCEDURE — 97140 MANUAL THERAPY 1/> REGIONS: CPT | Performed by: OCCUPATIONAL THERAPIST

## 2022-08-23 NOTE — PROGRESS NOTES
"Occupational Therapy Daily Treatment Note      Patient: Shanell Rollins   : 1980  Referring practitioner: Desiree Ware PA-C  Date of Initial Visit: Type: THERAPY  Noted: 2022  Today's Date: 2022  Patient seen for 5 sessions           Subjective Evaluation    History of Present Illness  Date of onset: 2022  Date of surgery: 2022  Mechanism of injury: ORIF with plate and screws.      PMHx includes: B shoulder pain, TIA     Subjective comment: Reports having minimal pain.  Covered her skin with gauze due to poison  Patient Occupation:  at [employer]. RTW date 22   Precautions and Work Restrictions: off workPain  Current pain ratin    Social Support  Lives with: family.    Hand dominance: right    Patient Goals  Patient goal: \"I want to be able to open doors and get back to normal life.\"           Objective   See Exercise, Manual, and Modality Logs for complete treatment.       Assessment/Plan    Visit Diagnoses:    ICD-10-CM ICD-9-CM   1. Left wrist fracture, sequela  S62.102S 905.2   2. Acute pain of left wrist  M25.532 719.43   3. Wrist stiffness, left  M25.632 719.53       Progress per Plan of Care           Timed:  Manual Therapy:                 10     mins  98355  Therapeutic Exercise:      15     mins  41906     Neuromuscular reeducation       0     mins 07306  Therapeutic Activity              0     mins  93787  Ultrasound:                  0     mins  63771     Untimed:  Electrical Stimulation:    0     mins  29816 ( );  Fluidotherapy      0     mins  27485    Timed Treatment:   25   mins   Total Treatment:     25   mins    Atif Ann OT, BABATUNDET  Occupational Therapist    Electronically signed      [place] license #: [id number]  "

## 2022-08-25 ENCOUNTER — TREATMENT (OUTPATIENT)
Dept: PHYSICAL THERAPY | Facility: CLINIC | Age: 42
End: 2022-08-25

## 2022-08-25 DIAGNOSIS — M25.632 WRIST STIFFNESS, LEFT: ICD-10-CM

## 2022-08-25 DIAGNOSIS — M25.532 ACUTE PAIN OF LEFT WRIST: ICD-10-CM

## 2022-08-25 DIAGNOSIS — S62.102S LEFT WRIST FRACTURE, SEQUELA: Primary | ICD-10-CM

## 2022-08-25 PROCEDURE — 97110 THERAPEUTIC EXERCISES: CPT | Performed by: OCCUPATIONAL THERAPIST

## 2022-08-25 PROCEDURE — 97140 MANUAL THERAPY 1/> REGIONS: CPT | Performed by: OCCUPATIONAL THERAPIST

## 2022-08-25 NOTE — PROGRESS NOTES
"Occupational Therapy Daily Treatment Note      Patient: Shanell Rollins   : 1980  Referring practitioner: Desiree Ware PA-C  Date of Initial Visit: Type: THERAPY  Noted: 2022  Today's Date: 2022  Patient seen for 6 sessions           Subjective Evaluation    History of Present Illness  Date of onset: 2022  Date of surgery: 2022  Mechanism of injury: ORIF with plate and screws.      PMHx includes: B shoulder pain, TIA     Subjective comment: \"I have not had to take any medication and I have been out of my splint the majority of the day without any issues.\"  Patient Occupation:  at [employer]. RTW date 22   Precautions and Work Restrictions: off workPain  Current pain ratin    Social Support  Lives with: family.    Hand dominance: right    Patient Goals  Patient goal: \"I want to be able to open doors and get back to normal life.\"           Objective   See Exercise, Manual, and Modality Logs for complete treatment.       Assessment & Plan     Assessment    Assessment details: Good tolerance to exercises.  Able to perform more light functional tasks without splint with less pain and difficulty.        Visit Diagnoses:    ICD-10-CM ICD-9-CM   1. Left wrist fracture, sequela  S62.102S 905.2   2. Acute pain of left wrist  M25.532 719.43   3. Wrist stiffness, left  M25.632 719.53       Progress per Plan of Care           Timed:  Manual Therapy:                 10     mins  78664  Therapeutic Exercise:      15     mins  93477     Neuromuscular reeducation       0     mins 61748  Therapeutic Activity              0     mins  68158  Ultrasound:                  0     mins  48948     Untimed:  Electrical Stimulation:    0     mins  00019 ( );  Fluidotherapy      0     mins  45881    Timed Treatment:   25   mins   Total Treatment:     25   mins    Atif Ann OT, BABATUNDET  Occupational Therapist    Electronically signed      [place] license #: [id number]  "

## 2022-08-29 ENCOUNTER — OFFICE VISIT (OUTPATIENT)
Dept: ORTHOPEDIC SURGERY | Facility: CLINIC | Age: 42
End: 2022-08-29

## 2022-08-29 VITALS — HEIGHT: 64 IN | WEIGHT: 150 LBS | BODY MASS INDEX: 25.61 KG/M2

## 2022-08-29 DIAGNOSIS — S52.502D CLOSED FRACTURE OF DISTAL END OF LEFT RADIUS WITH ROUTINE HEALING, UNSPECIFIED FRACTURE MORPHOLOGY, SUBSEQUENT ENCOUNTER: Primary | ICD-10-CM

## 2022-08-29 DIAGNOSIS — M25.532 LEFT WRIST PAIN: ICD-10-CM

## 2022-08-29 PROCEDURE — 99024 POSTOP FOLLOW-UP VISIT: CPT | Performed by: PHYSICIAN ASSISTANT

## 2022-08-29 RX ORDER — NAPROXEN SODIUM 220 MG
220 TABLET ORAL 2 TIMES DAILY PRN
COMMUNITY

## 2022-08-29 NOTE — PROGRESS NOTES
"Chief Complaint  Follow-up of the Left Wrist    Subjective          Shanell Rollins presents to [facility] for   History of Present Illness    Shanell Rollins presents today for a follow-up of her left wrist.  Patient is 6 weeks postop left wrist ORIF.  Today, patient states she is doing well.  She has continue with occupational therapy and noticed improvements in her range of motion as well as her strength.  She states that she only experiences pain occasionally when she overuses her wrist.  She takes Aleve as needed.  She has been wearing her wrist brace only as needed.  She denies new injuries.  Denies numbness or tingling.  She denies complications, redness, or drainage from her incision.      Allergies   Allergen Reactions   • Oxycodone Itching     AND BECOMES ANXIOUS AND MEAN        Social History     Socioeconomic History   • Marital status:    Tobacco Use   • Smoking status: Former Smoker     Quit date: 2021     Years since quittin.7   • Smokeless tobacco: Never Used   Vaping Use   • Vaping Use: Every day   • Substances: Nicotine, Flavoring   • Devices: Refillable tank   Substance and Sexual Activity   • Alcohol use: Not Currently   • Drug use: Never   • Sexual activity: Defer        I reviewed the patient's chief complaint, history of present illness, review of systems, past medical history, surgical history, family history, social history, medications, and allergy list.     REVIEW OF SYSTEMS    Constitutional: Denies fevers, chills, weight loss  Cardiovascular: Denies chest pain, shortness of breath  Skin: Denies rashes, acute skin changes  Neurologic: Denies headache, loss of consciousness  MSK: Left wrist pain      Objective   Vital Signs:   Ht 162.6 cm (64\")   Wt 68 kg (150 lb)   BMI 25.75 kg/m²     Body mass index is 25.75 kg/m².    Physical Exam    General: Alert. No acute distress.   Left upper extremity: Well-healed incision.  No concerning signs " for infection.  Forearm soft.  Demonstrates active wrist range of motion with associated stiffness.  Wrist flexion 20 degrees.  Wrist extension 10 degrees.  Full finger extension.  Full finger flexion.  Thumb opposition intact.  Palmar abduction of the thumb intact.  Sensation intact to the median, radial, and ulnar nerve distributions.  Palpable radial pulse.    Procedures    Imaging Results (Most Recent)     Procedure Component Value Units Date/Time    XR Wrist 2 View Left [[id number]] Resulted: 08/29/22 1115     Updated: 08/29/22 1117    Narrative:      Indications: Follow-up left wrist fracture    Views: AP and lateral left wrist    Findings: Left distal radius fracture is again seen.  Increased callus   formation about the fracture line.  Fracture fixation remains in place and   stable.  No hardware complications or loosening are seen.  No additional   fractures noted.    Comparative Data: Comparative data found and reviewed today.                   Assessment and Plan    Diagnoses and all orders for this visit:    1. Closed fracture of distal end of left radius with routine healing, unspecified fracture morphology, subsequent encounter (Primary)    2. Left wrist pain  -     XR Wrist 2 View Left        Shanell Rollins presents today 6 weeks postop left distal radius fracture ORIF.  X-rays were reviewed with the patient today.  Incision is well-healed.  No concerning signs for infection.  Patient is instructed to continue with formal occupational therapy as well as her home exercises.  We discussed continuing to improve her range of motion and gradually increasing strengthening exercises as tolerated.  Patient understood and agreed.  Use ice and elevation as needed.  Okay to wear wrist brace only as needed.  Work note was written today for patient to return to work on 9/19/2022 with a 5 pound lifting restriction for the left upper extremity.  Patient will follow up in 4 weeks for reevaluation.  We will obtain  x-rays of left wrist at next visit.    Call or return if symptoms worsen or patient has any concerns.   Will obtain X-Rays of left wrist at next visit.         Follow Up   Return in about 4 weeks (around 9/26/2022).  Patient was given instructions and counseling regarding her condition or for health maintenance advice. Please see specific information pulled into the AVS if appropriate.     Desiree Ware PA-C  08/29/22  11:40 EDT

## 2022-08-30 ENCOUNTER — TREATMENT (OUTPATIENT)
Dept: PHYSICAL THERAPY | Facility: CLINIC | Age: 42
End: 2022-08-30

## 2022-08-30 DIAGNOSIS — M25.632 WRIST STIFFNESS, LEFT: ICD-10-CM

## 2022-08-30 DIAGNOSIS — M25.532 ACUTE PAIN OF LEFT WRIST: ICD-10-CM

## 2022-08-30 DIAGNOSIS — S62.102S LEFT WRIST FRACTURE, SEQUELA: Primary | ICD-10-CM

## 2022-08-30 PROCEDURE — 97110 THERAPEUTIC EXERCISES: CPT | Performed by: OCCUPATIONAL THERAPIST

## 2022-08-30 PROCEDURE — 97140 MANUAL THERAPY 1/> REGIONS: CPT | Performed by: OCCUPATIONAL THERAPIST

## 2022-08-30 NOTE — PROGRESS NOTES
"Occupational Therapy Daily Treatment Note      Patient: Shanell Rollins   : 1980  Referring practitioner: Desiree Ware PA-C  Date of Initial Visit: Type: THERAPY  Noted: 2022  Today's Date: 2022  Patient seen for 7 sessions           Subjective Evaluation    History of Present Illness  Date of onset: 2022  Date of surgery: 2022  Mechanism of injury: ORIF with plate and screws.      PMHx includes: B shoulder pain, TIA     Subjective comment: Saw MD.  Report that MD said to continue therapy for strengthening before return to work.  Patient Occupation:  at [employer]. RTW date 22   Precautions and Work Restrictions: off workPain  Current pain ratin    Social Support  Lives with: family.    Hand dominance: right    Patient Goals  Patient goal: \"I want to be able to open doors and get back to normal life.\"           Objective   See Exercise, Manual, and Modality Logs for complete treatment.       Assessment/Plan    Visit Diagnoses:    ICD-10-CM ICD-9-CM   1. Left wrist fracture, sequela  S62.102S 905.2   2. Acute pain of left wrist  M25.532 719.43   3. Wrist stiffness, left  M25.632 719.53       Progress per Plan of Care           Timed:  Manual Therapy:                 10     mins  10717  Therapeutic Exercise:      15     mins  22608     Neuromuscular reeducation       0     mins 12055  Therapeutic Activity              0     mins  75673  Ultrasound:                  0     mins  44960     Untimed:  Electrical Stimulation:    0     mins  39474 ( );  Fluidotherapy      0     mins  70205    Timed Treatment:   25   mins   Total Treatment:     25   mins    Atif Ann OT, BABATUNDET  Occupational Therapist    Electronically signed      [place] license #: [id number]  "

## 2022-09-01 ENCOUNTER — TREATMENT (OUTPATIENT)
Dept: PHYSICAL THERAPY | Facility: CLINIC | Age: 42
End: 2022-09-01

## 2022-09-01 DIAGNOSIS — M25.532 ACUTE PAIN OF LEFT WRIST: ICD-10-CM

## 2022-09-01 DIAGNOSIS — M25.632 WRIST STIFFNESS, LEFT: ICD-10-CM

## 2022-09-01 DIAGNOSIS — S62.102S LEFT WRIST FRACTURE, SEQUELA: Primary | ICD-10-CM

## 2022-09-01 PROCEDURE — 97140 MANUAL THERAPY 1/> REGIONS: CPT | Performed by: OCCUPATIONAL THERAPIST

## 2022-09-01 PROCEDURE — 97110 THERAPEUTIC EXERCISES: CPT | Performed by: OCCUPATIONAL THERAPIST

## 2022-09-01 NOTE — PROGRESS NOTES
"Occupational Therapy Daily Treatment Note      Patient: Shanell Rollins   : 1980  Referring practitioner: Desiree Ware PA-C  Date of Initial Visit: Type: THERAPY  Noted: 2022  Today's Date: 2022  Patient seen for 8 sessions           Subjective Evaluation    History of Present Illness  Date of onset: 2022  Date of surgery: 2022  Mechanism of injury: ORIF with plate and screws.      PMHx includes: B shoulder pain, TIA     Subjective comment: Minimal pain noted.  Still using splint at night.  Patient Occupation:  at [employer]. RTW date 22   Precautions and Work Restrictions: off workPain  Current pain ratin    Social Support  Lives with: family.    Hand dominance: right    Patient Goals  Patient goal: \"I want to be able to open doors and get back to normal life.\"           Objective   See Exercise, Manual, and Modality Logs for complete treatment.       Assessment & Plan     Assessment    Assessment details: Good tolerance to increase in weight for wrist/forearm strengthening.        Visit Diagnoses:    ICD-10-CM ICD-9-CM   1. Left wrist fracture, sequela  S62.102S 905.2   2. Acute pain of left wrist  M25.532 719.43   3. Wrist stiffness, left  M25.632 719.53       Progress per Plan of Care           Timed:  Manual Therapy:                 10     mins  54706  Therapeutic Exercise:      15     mins  70364     Neuromuscular reeducation       0     mins 02509  Therapeutic Activity              0     mins  58092  Ultrasound:                  0     mins  63293     Untimed:  Electrical Stimulation:    0     mins  12652 ( );  Fluidotherapy      0     mins  92689    Timed Treatment:   25   mins   Total Treatment:     25   mins    Atif Ann OT, CHT  Occupational Therapist    Electronically signed      [place] license #: [id number]  "

## 2022-09-06 ENCOUNTER — TREATMENT (OUTPATIENT)
Dept: PHYSICAL THERAPY | Facility: CLINIC | Age: 42
End: 2022-09-06

## 2022-09-06 DIAGNOSIS — M25.632 WRIST STIFFNESS, LEFT: ICD-10-CM

## 2022-09-06 DIAGNOSIS — M25.532 ACUTE PAIN OF LEFT WRIST: ICD-10-CM

## 2022-09-06 DIAGNOSIS — S62.102S LEFT WRIST FRACTURE, SEQUELA: Primary | ICD-10-CM

## 2022-09-06 PROCEDURE — 97140 MANUAL THERAPY 1/> REGIONS: CPT | Performed by: OCCUPATIONAL THERAPIST

## 2022-09-06 PROCEDURE — 97110 THERAPEUTIC EXERCISES: CPT | Performed by: OCCUPATIONAL THERAPIST

## 2022-09-06 NOTE — PROGRESS NOTES
"Occupational Therapy Daily Treatment Note      Patient: Shanell Rollins   : 1980  Referring practitioner: Desiree Ware PA-C  Date of Initial Visit: Type: THERAPY  Noted: 2022  Today's Date: 2022  Patient seen for 9 sessions           Subjective Evaluation    History of Present Illness  Date of onset: 2022  Date of surgery: 2022  Mechanism of injury: ORIF with plate and screws.      PMHx includes: B shoulder pain, TIA     Subjective comment: Reports that she is doing well.  Patient Occupation:  at [employer]. RTW date 22   Precautions and Work Restrictions: off workPain  Current pain ratin    Social Support  Lives with: family.    Hand dominance: right    Patient Goals  Patient goal: \"I want to be able to open doors and get back to normal life.\"           Objective   See Exercise, Manual, and Modality Logs for complete treatment.       Assessment & Plan     Assessment    Assessment details: Good tolerance to exercises. Able to tolerate increased resistance.        Visit Diagnoses:    ICD-10-CM ICD-9-CM   1. Left wrist fracture, sequela  S62.102S 905.2   2. Acute pain of left wrist  M25.532 719.43   3. Wrist stiffness, left  M25.632 719.53       Progress per Plan of Care           Timed:  Manual Therapy:                 9     mins  55714  Therapeutic Exercise:      15     mins  42589     Neuromuscular reeducation       0     mins 33353  Therapeutic Activity              0     mins  09015  Ultrasound:                  0     mins  85414     Untimed:  Electrical Stimulation:    0     mins  60223 ( );  Fluidotherapy      0     mins  80594    Timed Treatment:   23   mins   Total Treatment:     23   mins    Atif Ann OT, BABATUNDET  Occupational Therapist    Electronically signed      [place] license #: [id number]  "

## 2022-09-08 ENCOUNTER — TREATMENT (OUTPATIENT)
Dept: PHYSICAL THERAPY | Facility: CLINIC | Age: 42
End: 2022-09-08

## 2022-09-08 DIAGNOSIS — M25.632 WRIST STIFFNESS, LEFT: ICD-10-CM

## 2022-09-08 DIAGNOSIS — S62.102S LEFT WRIST FRACTURE, SEQUELA: Primary | ICD-10-CM

## 2022-09-08 DIAGNOSIS — M25.532 ACUTE PAIN OF LEFT WRIST: ICD-10-CM

## 2022-09-08 PROCEDURE — 97530 THERAPEUTIC ACTIVITIES: CPT | Performed by: OCCUPATIONAL THERAPIST

## 2022-09-08 PROCEDURE — 97110 THERAPEUTIC EXERCISES: CPT | Performed by: OCCUPATIONAL THERAPIST

## 2022-09-08 NOTE — PROGRESS NOTES
Re-Assessment / Re-Certification      Patient: Shanell Rollins   : 1980  Diagnosis/ICD-10 Code:  Left wrist fracture, sequela [S62.102S]  Referring practitioner: Desiree Ware PA-C  Date of Initial Visit: Type: THERAPY  Noted: 2022  Today's Date: 2022  Patient seen for 10 sessions      Subjective:     Quick Dash 1956 1-19% functional limitation  Clinical Progress: improved  Home Program Compliance: Yes  Treatment has included: therapeutic exercise, manual therapy and therapeutic activity    Subjective   Objective          Tenderness     Additional Tenderness Details  Tenderness resolved    Neurological Testing     Sensation     Wrist/Hand   Left   Intact: light touch    Active Range of Motion     Left Wrist   Wrist flexion: 40 degrees   Wrist extension: 50 degrees   Radial deviation: 10 degrees   Ulnar deviation: 25 degrees     Additional Active Range of Motion Details  Total active wrist motion 125 degrees   Forearm pronation 70 supination 90  Shoulder and elbow AROM WNL's  Finger ROM is WNLs.  Lacks 3cm to distal palmar crease in thumb flexion.  Able to oppose to small finger tip    Strength/Myotome Testing     Left Wrist/Hand   Wrist extension: 4+  Wrist flexion: 4+  Radial deviation: 5  Ulnar deviation: 4+     (2nd hand position)     Trial 1: 25 lbs    Trial 2: 25 lbs    Trial 3: 25 lbs    Average: 25 lbs    Right Wrist/Hand   Normal wrist strength     (2nd hand position)     Trial 1: 65 lbs    Trial 2: 55 lbs    Trial 3: 50 lbs    Average: 56.67 lbs    Swelling     Left Wrist/Hand     Additional Swelling Details  Mild       Assessment/Plan  Plan Goals: Wrist Pain  LTG 1  12 weeks:  Decrease pain to 1/10 to improve sleep and performance of ADLs   Status:  Met  STG 1  6 weeks:  Decrease pain to 4/10  Status:  Met    2.  Decreased wrist AROM  LTG 2  12 weeks:  Increase wrist ROM to 120 degrees of total active motion to improve ability to grasp  Status:  Met  STG 2  6 weeks:  Increase  wrist ROM to 80 degrees of total active motion  Status:  Met    3.  Decreased strength  LTG 3  12 weeks:  Increase strength to 5/5 MMT and  strength to norms for age group to improve ability to grasp, lift, carry and handle objects  Status:  Not met  LTG 3  6 weeks:  Good tolerance to strength testing  Status:  Met    4.  Decreased functional use of the wrist and hand  LTG 4  12 weeks:  Improved function score to 19/55 or better  Status:  Met  LTG 4  6 weeks:  Improved function score to 27 or better  Status:  Met  Visit Diagnoses:    ICD-10-CM ICD-9-CM   1. Left wrist fracture, sequela  S62.102S 905.2   2. Acute pain of left wrist  M25.532 719.43   3. Wrist stiffness, left  M25.632 719.53       Progress toward previous goals: Partially Met  Met alll goals except LTG for strength.       Recommendations: Continue as planned  Timeframe: 1 month  Prognosis to achieve goals: good    OT Signature: Atif Ann OT, CHT    Electronically signed    [place] license #: [id number]    Based upon review of the patient's progress and continued therapy plan, it is my medical opinion that Shanell Rollins should continue physical therapy treatment at [facility]  [address] [place] [phone] [phone].    Signature: __________________________________  Desiree Ware PA-C  NPI: [id number]    Timed:  Manual Therapy:                 0     mins  88176  Therapeutic Exercise:      15     mins  19682     Neuromuscular reeducation       0     mins 76702  Therapeutic Activity              10     mins  24469  Ultrasound:                  0     mins  82987      Untimed:  Electrical Stimulation:    0     mins  19942 ( )  Fluidotherapy     0     mins  68823    Timed Treatment:   25   mins   Total Treatment:     25   mins

## 2022-09-15 ENCOUNTER — TREATMENT (OUTPATIENT)
Dept: PHYSICAL THERAPY | Facility: CLINIC | Age: 42
End: 2022-09-15

## 2022-09-15 DIAGNOSIS — M25.632 WRIST STIFFNESS, LEFT: ICD-10-CM

## 2022-09-15 DIAGNOSIS — S62.102S LEFT WRIST FRACTURE, SEQUELA: Primary | ICD-10-CM

## 2022-09-15 DIAGNOSIS — M25.532 ACUTE PAIN OF LEFT WRIST: ICD-10-CM

## 2022-09-15 PROCEDURE — 97530 THERAPEUTIC ACTIVITIES: CPT | Performed by: OCCUPATIONAL THERAPIST

## 2022-09-15 PROCEDURE — 97110 THERAPEUTIC EXERCISES: CPT | Performed by: OCCUPATIONAL THERAPIST

## 2022-09-15 NOTE — PROGRESS NOTES
"Occupational Therapy Daily Treatment Note      Patient: Shanell Rollins   : 1980  Referring practitioner: Desiree Ware PA-C  Date of Initial Visit: Type: THERAPY  Noted: 2022  Today's Date: 9/15/2022  Patient seen for 11 sessions           Subjective Evaluation    History of Present Illness  Date of onset: 2022  Date of surgery: 2022  Mechanism of injury: ORIF with plate and screws.      PMHx includes: B shoulder pain, TIA     Subjective comment: \"I have been busy and using my L hand more.\"  Patient Occupation:  at [employer]. RTW date 22   Precautions and Work Restrictions: off workPain  Current pain ratin    Social Support  Lives with: family.    Hand dominance: right    Patient Goals  Patient goal: \"I want to be able to open doors and get back to normal life.\"           Objective   See Exercise, Manual, and Modality Logs for complete treatment.       Assessment & Plan     Assessment    Assessment details: Progressing well.        Visit Diagnoses:    ICD-10-CM ICD-9-CM   1. Left wrist fracture, sequela  S62.102S 905.2   2. Acute pain of left wrist  M25.532 719.43   3. Wrist stiffness, left  M25.632 719.53       Progress per Plan of Care           Timed:  Manual Therapy:                 0     mins  26965  Therapeutic Exercise:      15     mins  08437     Neuromuscular reeducation       0     mins 12056  Therapeutic Activity              10     mins  56129  Ultrasound:                  0     mins  98375     Untimed:  Electrical Stimulation:    0     mins  30592 ( );  Fluidotherapy      0     mins  80737    Timed Treatment:   25   mins   Total Treatment:     25   mins    Atif Ann OT, BABATUNDET  Occupational Therapist    Electronically signed      [place] license #: [id number]  "

## 2022-09-20 ENCOUNTER — TREATMENT (OUTPATIENT)
Dept: PHYSICAL THERAPY | Facility: CLINIC | Age: 42
End: 2022-09-20

## 2022-09-20 DIAGNOSIS — M25.632 WRIST STIFFNESS, LEFT: ICD-10-CM

## 2022-09-20 DIAGNOSIS — M25.532 ACUTE PAIN OF LEFT WRIST: ICD-10-CM

## 2022-09-20 DIAGNOSIS — S62.102S LEFT WRIST FRACTURE, SEQUELA: Primary | ICD-10-CM

## 2022-09-20 PROCEDURE — 97110 THERAPEUTIC EXERCISES: CPT | Performed by: OCCUPATIONAL THERAPIST

## 2022-09-20 PROCEDURE — 97530 THERAPEUTIC ACTIVITIES: CPT | Performed by: OCCUPATIONAL THERAPIST

## 2022-09-20 NOTE — PROGRESS NOTES
"Occupational Therapy Daily Treatment Note      Patient: Shanell Rollins   : 1980  Referring practitioner: Desiree Ware PA-C  Date of Initial Visit: Type: THERAPY  Noted: 2022  Today's Date: 2022  Patient seen for 12 sessions           Subjective Evaluation    History of Present Illness  Date of onset: 2022  Date of surgery: 2022  Mechanism of injury: ORIF with plate and screws.      PMHx includes: B shoulder pain, TIA     Subjective comment: Reports that she is back to work and doing ok.  Patient Occupation:  at [employer]. RTW date 22   Precautions and Work Restrictions: off workPain  Current pain ratin    Social Support  Lives with: family.    Hand dominance: right    Patient Goals  Patient goal: \"I want to be able to open doors and get back to normal life.\"           Objective   See Exercise, Manual, and Modality Logs for complete treatment.       Assessment & Plan     Assessment    Assessment details: Performs exercises without difficulty.  Able to progress with strengthening.        Visit Diagnoses:    ICD-10-CM ICD-9-CM   1. Left wrist fracture, sequela  S62.102S 905.2   2. Acute pain of left wrist  M25.532 719.43   3. Wrist stiffness, left  M25.632 719.53       Progress per Plan of Care           Timed:  Manual Therapy:                 0     mins  59598  Therapeutic Exercise:      15     mins  22636     Neuromuscular reeducation       0     mins 40002  Therapeutic Activity              10     mins  39688  Ultrasound:                  0     mins  36898     Untimed:  Electrical Stimulation:    0     mins  74987 ( );  Fluidotherapy      0     mins  28409    Timed Treatment:   25   mins   Total Treatment:     25   mins    Atif Ann OT, CHT  Occupational Therapist    Electronically signed      [place] license #: [id number]  "

## 2022-09-22 ENCOUNTER — TREATMENT (OUTPATIENT)
Dept: PHYSICAL THERAPY | Facility: CLINIC | Age: 42
End: 2022-09-22

## 2022-09-22 DIAGNOSIS — M25.532 ACUTE PAIN OF LEFT WRIST: ICD-10-CM

## 2022-09-22 DIAGNOSIS — M25.632 WRIST STIFFNESS, LEFT: ICD-10-CM

## 2022-09-22 DIAGNOSIS — S62.102S LEFT WRIST FRACTURE, SEQUELA: Primary | ICD-10-CM

## 2022-09-22 PROCEDURE — 97530 THERAPEUTIC ACTIVITIES: CPT | Performed by: OCCUPATIONAL THERAPIST

## 2022-09-22 PROCEDURE — 97110 THERAPEUTIC EXERCISES: CPT | Performed by: OCCUPATIONAL THERAPIST

## 2022-09-22 NOTE — PROGRESS NOTES
"Occupational Therapy Daily Treatment Note      Patient: Shanell Rollins   : 1980  Referring practitioner: Desiree Ware PA-C  Date of Initial Visit: Type: THERAPY  Noted: 2022  Today's Date: 2022  Patient seen for 13 sessions           Subjective Evaluation    History of Present Illness  Date of onset: 2022  Date of surgery: 2022  Mechanism of injury: ORIF with plate and screws.      PMHx includes: B shoulder pain, TIA     Subjective comment: Reports that she is doing well and not having any problems at work.  Patient Occupation:  at [employer]. RTW date 22   Precautions and Work Restrictions: Back to work with 5lb restrictionPain  Current pain ratin    Social Support  Lives with: family.    Hand dominance: right    Patient Goals  Patient goal: \"I want to be able to open doors and get back to normal life.\"           Objective   See Exercise, Manual, and Modality Logs for complete treatment.       Assessment & Plan     Assessment    Assessment details: Good tolerance to exercises.        Visit Diagnoses:    ICD-10-CM ICD-9-CM   1. Left wrist fracture, sequela  S62.102S 905.2   2. Acute pain of left wrist  M25.532 719.43   3. Wrist stiffness, left  M25.632 719.53       Progress per Plan of Care           Timed:  Manual Therapy:                 0     mins  05552  Therapeutic Exercise:      15     mins  96919     Neuromuscular reeducation       0     mins 98934  Therapeutic Activity              10     mins  20104  Ultrasound:                  0     mins  21599     Untimed:  Electrical Stimulation:    0     mins  81883 ( );  Fluidotherapy      0     mins  96406    Timed Treatment:   25   mins   Total Treatment:     25   mins    Atif Ann OT, BABATUNDET  Occupational Therapist    Electronically signed      [place] license #: [id number]  "

## 2022-09-30 ENCOUNTER — OFFICE VISIT (OUTPATIENT)
Dept: ORTHOPEDIC SURGERY | Facility: CLINIC | Age: 42
End: 2022-09-30

## 2022-09-30 VITALS — BODY MASS INDEX: 26.19 KG/M2 | WEIGHT: 147.8 LBS | HEIGHT: 63 IN

## 2022-09-30 DIAGNOSIS — M25.532 LEFT WRIST PAIN: ICD-10-CM

## 2022-09-30 DIAGNOSIS — S52.502D CLOSED FRACTURE OF DISTAL END OF LEFT RADIUS WITH ROUTINE HEALING, UNSPECIFIED FRACTURE MORPHOLOGY, SUBSEQUENT ENCOUNTER: Primary | ICD-10-CM

## 2022-09-30 PROCEDURE — 99024 POSTOP FOLLOW-UP VISIT: CPT | Performed by: PHYSICIAN ASSISTANT

## 2022-09-30 NOTE — PROGRESS NOTES
"Chief Complaint  Follow-up of the Left Wrist    Subjective          Shanell Rollins presents to [facility] for   History of Present Illness    Shanell Rollins presents today for for follow-up of her left wrist.  Patient is 10 weeks postop left wrist ORIF.  Today, she states she is doing well.  She is continue with formal occupational therapy and states she is making provement in range of motion as well as strength.  She reports some stiffness.  She has returned to work without complications.  She wears her wrist brace only as needed.  She has avoided lifting, pushing, pulling anything heavy at this point.  She denies any new injuries.  Denies numbness or tingling.      Allergies   Allergen Reactions   • Oxycodone Itching     AND BECOMES ANXIOUS AND MEAN        Social History     Socioeconomic History   • Marital status:    Tobacco Use   • Smoking status: Former Smoker     Quit date: 2021     Years since quittin.8   • Smokeless tobacco: Never Used   Vaping Use   • Vaping Use: Every day   • Substances: Nicotine, Flavoring   • Devices: Refillable tank   Substance and Sexual Activity   • Alcohol use: Not Currently   • Drug use: Never   • Sexual activity: Defer        I reviewed the patient's chief complaint, history of present illness, review of systems, past medical history, surgical history, family history, social history, medications, and allergy list.     REVIEW OF SYSTEMS    Constitutional: Denies fevers, chills, weight loss  Cardiovascular: Denies chest pain, shortness of breath  Skin: Denies rashes, acute skin changes  Neurologic: Denies headache, loss of consciousness  MSK: Left wrist pain      Objective   Vital Signs:   Ht 160 cm (63\")   Wt 67 kg (147 lb 12.8 oz)   BMI 26.18 kg/m²     Body mass index is 26.18 kg/m².    Physical Exam    General: Alert. No acute distress.   Left upper extremity: Incision is well-healed.  No concerning signs for infection.  " Forearm soft.  Active wrist range of motion.  Wrist flexion 70 degrees.  Wrist extension 30 degrees.  No pain with radial and ulnar deviation.  Full finger flexion and extension.  Thumb opposition intact.  Palmar adduction of thumb intact.  Sensation intact to median, radial, and ulnar nerve distributions.  Palpable radial pulse.    Procedures    Imaging Results (Most Recent)     Procedure Component Value Units Date/Time    XR Wrist 2 View Left [[id number]] Resulted: 09/30/22 0927     Updated: 09/30/22 0929    Narrative:      Indications: Follow up left wrist fracture    Views: AP and lateral left wrist    Findings: Left distal radius fracture is seen with an increase in callus   formation about the fracture line. Fracture alignment is stable. Plate and   screw construct in place. No hardware complications or loosening. DRUJ is   well aligned. No additional fractures.     Comparative Data: Comparative data found and reviewed today.                    Assessment and Plan    Diagnoses and all orders for this visit:    1. Closed fracture of distal end of left radius with routine healing, unspecified fracture morphology, subsequent encounter (Primary)    2. Left wrist pain  -     XR Wrist 2 View Left        Shanell Rollins presents today 10 weeks postop left wrist ORIF.  X-rays reviewed with the patient today. Incision is well healed. Patient will continue with formal occupational therapy as well as her home exercises.  We discussed importance of continuing her home exercises after she has completed formal occupational therapy.  Patient understood and agreed.  Work note written today with a 5 pound lifting restriction.  Patient will continue with wrist brace only as needed.  Patient will follow up in 6 weeks for reevaluation.  Will obtain x-rays of left wrist at next visit.    Call or return if symptoms worsen or patient has any concerns.   Will obtain X-Rays of left wrist at next visit.         Follow Up   Return in  about 6 weeks (around 11/11/2022).  Patient was given instructions and counseling regarding her condition or for health maintenance advice. Please see specific information pulled into the AVS if appropriate.     Desiree Ware PA-C  09/30/22  09:29 EDT

## 2022-10-04 ENCOUNTER — TREATMENT (OUTPATIENT)
Dept: PHYSICAL THERAPY | Facility: CLINIC | Age: 42
End: 2022-10-04

## 2022-10-04 DIAGNOSIS — S62.102S LEFT WRIST FRACTURE, SEQUELA: Primary | ICD-10-CM

## 2022-10-04 DIAGNOSIS — M25.532 ACUTE PAIN OF LEFT WRIST: ICD-10-CM

## 2022-10-04 DIAGNOSIS — M25.632 WRIST STIFFNESS, LEFT: ICD-10-CM

## 2022-10-04 PROCEDURE — 97110 THERAPEUTIC EXERCISES: CPT | Performed by: OCCUPATIONAL THERAPIST

## 2022-10-04 PROCEDURE — 97530 THERAPEUTIC ACTIVITIES: CPT | Performed by: OCCUPATIONAL THERAPIST

## 2022-10-04 NOTE — PROGRESS NOTES
Occupational Therapy Daily Treatment Note      Patient: Shanell Rollins   : 1980  Referring practitioner: Desiree Ware PA-C  Date of Initial Visit: Type: THERAPY  Noted: 2022  Today's Date: 10/4/2022  Patient seen for 14 sessions           Subjective       Objective   See Exercise, Manual, and Modality Logs for complete treatment.       Assessment & Plan     Assessment    Assessment details: Good tolerance to exercises.  Improved ability to perform wall push-ups.        Visit Diagnoses:    ICD-10-CM ICD-9-CM   1. Left wrist fracture, sequela  S62.102S 905.2   2. Acute pain of left wrist  M25.532 719.43   3. Wrist stiffness, left  M25.632 719.53       Progress per Plan of Care           Timed:  Manual Therapy:                 0     mins  27904  Therapeutic Exercise:      15     mins  43745     Neuromuscular reeducation       0     mins 18873  Therapeutic Activity              10     mins  81823  Ultrasound:                  0     mins  15396     Untimed:  Electrical Stimulation:    0     mins  13899 ( );  Fluidotherapy      0     mins  83388    Timed Treatment:   25   mins   Total Treatment:     25   mins    Atif Ann OT, BABATUNDET  Occupational Therapist    Electronically signed      [place] license #: [id number]

## 2022-10-06 ENCOUNTER — TREATMENT (OUTPATIENT)
Dept: PHYSICAL THERAPY | Facility: CLINIC | Age: 42
End: 2022-10-06

## 2022-10-06 DIAGNOSIS — M25.632 WRIST STIFFNESS, LEFT: ICD-10-CM

## 2022-10-06 DIAGNOSIS — S62.102S LEFT WRIST FRACTURE, SEQUELA: Primary | ICD-10-CM

## 2022-10-06 DIAGNOSIS — M25.532 ACUTE PAIN OF LEFT WRIST: ICD-10-CM

## 2022-10-06 PROCEDURE — 97110 THERAPEUTIC EXERCISES: CPT | Performed by: OCCUPATIONAL THERAPIST

## 2022-10-06 PROCEDURE — 97530 THERAPEUTIC ACTIVITIES: CPT | Performed by: OCCUPATIONAL THERAPIST

## 2022-10-06 NOTE — PROGRESS NOTES
Re-Assessment / Re-Certification/Discharge      Patient: Shanell Rollins   : 1980  Diagnosis/ICD-10 Code:  Left wrist fracture, sequela [S62.102S]  Referring practitioner: Desiree Ware PA-C  Date of Initial Visit: No linked episodes  Today's Date: 11/15/2022  Patient seen for 15 sessions      Subjective:     Quick Dash  0% functional limitation  Clinical Progress: improved  Home Program Compliance: Yes  Treatment has included: therapeutic exercise, manual therapy and therapeutic activity    Subjective   Objective          Tenderness     Additional Tenderness Details  Tenderness resolved    Neurological Testing     Sensation     Wrist/Hand   Left   Intact: light touch    Active Range of Motion     Left Wrist   Wrist flexion: 45 degrees   Wrist extension: 55 degrees   Radial deviation: 20 degrees   Ulnar deviation: 25 degrees     Additional Active Range of Motion Details  Total active wrist motion 145 degrees   Forearm pronation 80 supination 90  Shoulder and elbow AROM WNL's  Finger ROM is WNLs.  Full thumb flexion.  Able to oppose to small finger tip    Strength/Myotome Testing     Left Wrist/Hand   Wrist extension: 5  Wrist flexion: 5  Radial deviation: 5  Ulnar deviation: 5     (2nd hand position)     Trial 1: 45 lbs    Trial 2: 42 lbs    Trial 3: 40 lbs    Average: 42.33 lbs    Right Wrist/Hand   Normal wrist strength     (2nd hand position)     Trial 1: 55 lbs    Trial 2: 60 lbs    Trial 3: 50 lbs    Average: 55 lbs    Swelling     Left Wrist/Hand     Additional Swelling Details  Mild       Assessment & Plan     Assessment    Assessment details: Good progress.  Met all goals.  Ready for HEP and return to regular duty work.      Plan Goals: Wrist Pain  LTG 1  12 weeks:  Decrease pain to 1/10 to improve sleep and performance of ADLs   Status:  Met  STG 1  6 weeks:  Decrease pain to 4/10  Status:  Met    2.  Decreased wrist AROM  LTG 2  12 weeks:  Increase wrist ROM to 120 degrees of total  active motion to improve ability to grasp  Status:  Met  STG 2  6 weeks:  Increase wrist ROM to 80 degrees of total active motion  Status:  Met    3.  Decreased strength  LTG 3  12 weeks:  Increase strength to 5/5 MMT and  strength to norms for age group to improve ability to grasp, lift, carry and handle objects  Status:  Met  LTG 3  6 weeks:  Good tolerance to strength testing  Status:  Met    4.  Decreased functional use of the wrist and hand  LTG 4  12 weeks:  Improved function score to 19/55 or better  Status:  Met  LTG 4  6 weeks:  Improved function score to 27 or better  Status:  Met  Visit Diagnoses:    ICD-10-CM ICD-9-CM   1. Left wrist fracture, sequela  S62.102S 905.2   2. Acute pain of left wrist  M25.532 719.43   3. Wrist stiffness, left  M25.632 719.53       Progress toward previous goals: All Met       Recommendations: Discharge  Timeframe: Discharge to [facility]  Prognosis to achieve goals: good    OT Signature: Atif Ann OT, CHT    Electronically signed    [place] license #: [id number]    Based upon review of the patient's progress and continued therapy plan, it is my medical opinion that Shanell Rollins should continue with [facility] and be discharged from occupational therapy treatment at [facility]  [address] [place] [id number] [phone].    Signature: __________________________________  Desiree Ware PA-C  NPI: [id number]    Timed:  Manual Therapy:                 0     mins  68374  Therapeutic Exercise:      15     mins  39014     Neuromuscular reeducation       0     mins 40064  Therapeutic Activity              10     mins  25647  Ultrasound:                  0     mins  78112      Untimed:  Electrical Stimulation:    0     mins  03003 ( )  Fluidotherapy     0     mins  32279    Timed Treatment:   25   mins   Total Treatment:     25   mins

## 2022-11-10 NOTE — PROGRESS NOTES
"Chief Complaint  Follow-up of the Left Wrist    Subjective          Shanell Rollins presents to [facility]   History of Present Illness    Shanell Rollins presents today for a follow-up of her left wrist.  Patient is 4 months postop left wrist ORIF.  Today, patient states she is doing great.  She reports no complications or concerns with her left wrist.  She has been doing all activities without complications.  She reports good range of motion and has noticed improvements in her strength.  She completed formal occupational therapy but continues with her home exercises.  She denies new injuries.  Denies numbness or tingling.      Allergies   Allergen Reactions   • Oxycodone Itching     AND BECOMES ANXIOUS AND MEAN        Social History     Socioeconomic History   • Marital status:    Tobacco Use   • Smoking status: Former     Types: Cigarettes     Quit date: 2021     Years since quittin.9   • Smokeless tobacco: Never   Vaping Use   • Vaping Use: Every day   • Substances: Nicotine, Flavoring   • Devices: Refillable tank   Substance and Sexual Activity   • Alcohol use: Not Currently   • Drug use: Never   • Sexual activity: Defer        I reviewed the patient's chief complaint, history of present illness, review of systems, past medical history, surgical history, family history, social history, medications, and allergy list.     REVIEW OF SYSTEMS    Constitutional: Denies fevers, chills, weight loss  Cardiovascular: Denies chest pain, shortness of breath  Skin: Denies rashes, acute skin changes  Neurologic: Denies headache, loss of consciousness  MSK: Left wrist pain      Objective   Vital Signs:   Ht 160 cm (63\")   Wt 66.7 kg (147 lb)   BMI 26.04 kg/m²     Body mass index is 26.04 kg/m².    Physical Exam    General: Alert. No acute distress.   Left upper extremity: Well-healed incision.  Forearm soft.  Nontender to the distal radius or distal ulna.  Demonstrates " active wrist range of motion.  Wrist flexion 70 degrees.  Wrist extension 30 degrees.  No pain with radial ulnar deviation.  No pain with wrist range of motion.  Full finger flexion and extension.  Thumb opposition intact.  Palmar adduction symmetric.  Sensation intact in median, radial, and ulnar nerve distributions.  Palpable radial pulse.    Procedures    Imaging Results (Most Recent)     Procedure Component Value Units Date/Time    XR Wrist 2 View Left [[id number]] Resulted: 11/11/22 1032     Updated: 11/11/22 1033    Narrative:      Indications: Follow-up left wrist.    Views: AP and lateral left wrist    Findings: Left distal radius fracture is well healing.  Fracture alignment   is stable.  Fracture fixation in place without complications or loosening.    DRUJ is well aligned.  No additional fractures.    Comparative Data: Comparative data found and reviewed today                   Assessment and Plan    Diagnoses and all orders for this visit:    1. Closed fracture of distal end of left radius with routine healing, unspecified fracture morphology, subsequent encounter (Primary)  -     XR Wrist 2 View Left        Shanell Rollins presents today 4 months postop left wrist ORIF.  X-rays reviewed with the patient today.  Patient instructed to continue with her home exercises.  Continue working on range of motion as well as gradual strengthening exercises.  Patient expressed understanding.  Continue with activities as tolerated.  Work note written today.      Patient will follow up in 4 weeks for reevaluation.  We will obtain new x-rays of the left wrist at next visit.      Call or return if symptoms worsen or patient has any concerns.       Follow Up   Return in about 4 weeks (around 12/9/2022).  Patient was given instructions and counseling regarding her condition or for health maintenance advice. Please see specific information pulled into the AVS if appropriate.     Desiree Ware PA-C  11/11/22  10:34  EST

## 2022-11-11 ENCOUNTER — OFFICE VISIT (OUTPATIENT)
Dept: ORTHOPEDIC SURGERY | Facility: CLINIC | Age: 42
End: 2022-11-11

## 2022-11-11 VITALS — BODY MASS INDEX: 26.05 KG/M2 | WEIGHT: 147 LBS | HEIGHT: 63 IN

## 2022-11-11 DIAGNOSIS — S52.502D CLOSED FRACTURE OF DISTAL END OF LEFT RADIUS WITH ROUTINE HEALING, UNSPECIFIED FRACTURE MORPHOLOGY, SUBSEQUENT ENCOUNTER: Primary | ICD-10-CM

## 2022-11-11 PROCEDURE — 99213 OFFICE O/P EST LOW 20 MIN: CPT | Performed by: PHYSICIAN ASSISTANT

## 2022-12-12 ENCOUNTER — OFFICE VISIT (OUTPATIENT)
Dept: ORTHOPEDIC SURGERY | Facility: CLINIC | Age: 42
End: 2022-12-12

## 2022-12-12 VITALS — WEIGHT: 156 LBS | BODY MASS INDEX: 26.63 KG/M2 | HEIGHT: 64 IN

## 2022-12-12 DIAGNOSIS — M25.532 LEFT WRIST PAIN: ICD-10-CM

## 2022-12-12 DIAGNOSIS — S52.502D CLOSED FRACTURE OF DISTAL END OF LEFT RADIUS WITH ROUTINE HEALING, UNSPECIFIED FRACTURE MORPHOLOGY, SUBSEQUENT ENCOUNTER: Primary | ICD-10-CM

## 2022-12-12 PROCEDURE — 99213 OFFICE O/P EST LOW 20 MIN: CPT | Performed by: PHYSICIAN ASSISTANT

## 2022-12-12 NOTE — PROGRESS NOTES
"Chief Complaint  Follow-up of the Left Wrist    Subjective          Shanell Rollins presents to [facility]   History of Present Illness    Shanell Rollins presents today for a follow-up of her left wrist.  Patient is 5 months postop left wrist ORIF.  Today, patient states she is doing well.  She reports no pain to her wrist.  She states that she has good range of motion and her strength is continue to return.  She is able to do all activities without complications or concerns.  She denies new injuries.  Denies numbness or tingling.  Denies swelling.      Allergies   Allergen Reactions   • Oxycodone Itching     AND BECOMES ANXIOUS AND MEAN        Social History     Socioeconomic History   • Marital status:    Tobacco Use   • Smoking status: Former     Types: Cigarettes     Quit date: 2021     Years since quittin.0   • Smokeless tobacco: Never   Vaping Use   • Vaping Use: Every day   • Substances: Nicotine, Flavoring   • Devices: Refillable tank   Substance and Sexual Activity   • Alcohol use: Not Currently   • Drug use: Never   • Sexual activity: Defer        I reviewed the patient's chief complaint, history of present illness, review of systems, past medical history, surgical history, family history, social history, medications, and allergy list.     REVIEW OF SYSTEMS    Constitutional: Denies fevers, chills, weight loss  Cardiovascular: Denies chest pain, shortness of breath  Skin: Denies rashes, acute skin changes  Neurologic: Denies headache, loss of consciousness  MSK: Left wrist pain      Objective   Vital Signs:   Ht 162.6 cm (64\")   Wt 70.8 kg (156 lb)   BMI 26.78 kg/m²     Body mass index is 26.78 kg/m².    Physical Exam    General: Alert. No acute distress.   Left upper extremity: Well-healed incision.  No swelling.  Nontender to the distal radius or distal ulna.  Forearm soft.  Active wrist range of motion with no associated pain.  Wrist range of motion " is the same as the contralateral side.  No pain with radial or ulnar deviation.  Full finger flexion and extension.  Able to make a tight fist.  Thumb opposition intact.  Palmar abduction of thumb intact.  Sensation intact in median, radial, and ulnar nerve distributions.  Palpable radial pulse.    Procedures    Imaging Results (Most Recent)     Procedure Component Value Units Date/Time    XR Wrist 2 View Left [[id number]] Resulted: 12/12/22 0904     Updated: 12/12/22 0908    Narrative:      Indications: Follow-up left wrist    Views: AP and lateral left wrist    Findings: Well-healed left distal radius fracture with stable alignment.    Fracture fixation with volar plate and screw construct remains in place   and stable.  No hardware loosening or complications.  DRUJ is well   aligned.    Comparative Data: Comparative data found and reviewed today.                   Assessment and Plan    Diagnoses and all orders for this visit:    1. Closed fracture of distal end of left radius with routine healing, unspecified fracture morphology, subsequent encounter (Primary)    2. Left wrist pain  -     XR Wrist 2 View Left        Shanell Rollins presents today 5 months postop left wrist ORIF.  X-rays reviewed with the patient today.  Fracture is well-healed with fracture fixation stable.  Patient is instructed to continue with her home exercises for both range of motion and strength.  Continue returning to activities as tolerated.  Patient has no restrictions at this time.  Work note written today.      Patient will follow up as needed.      Call or return if symptoms worsen or patient has any concerns.       Follow Up   Return if symptoms worsen or fail to improve.  Patient was given instructions and counseling regarding her condition or for health maintenance advice. Please see specific information pulled into the AVS if appropriate.     Desiree Ware PA-C  12/12/22  09:11 EST

## 2024-11-18 ENCOUNTER — TRANSCRIBE ORDERS (OUTPATIENT)
Dept: ADMINISTRATIVE | Facility: HOSPITAL | Age: 44
End: 2024-11-18
Payer: COMMERCIAL

## 2024-11-18 DIAGNOSIS — Z12.31 SCREENING MAMMOGRAM, ENCOUNTER FOR: Primary | ICD-10-CM

## 2024-11-26 ENCOUNTER — TRANSCRIBE ORDERS (OUTPATIENT)
Dept: ADMINISTRATIVE | Facility: HOSPITAL | Age: 44
End: 2024-11-26
Payer: COMMERCIAL

## 2024-11-26 DIAGNOSIS — R10.0 ACUTE ABDOMINAL PAIN SYNDROME: Primary | ICD-10-CM

## 2025-01-07 ENCOUNTER — HOSPITAL ENCOUNTER (OUTPATIENT)
Dept: CT IMAGING | Facility: HOSPITAL | Age: 45
Discharge: HOME | End: 2025-01-07
Admitting: NURSE PRACTITIONER
Payer: COMMERCIAL

## 2025-01-07 DIAGNOSIS — R10.0 ACUTE ABDOMINAL PAIN SYNDROME: ICD-10-CM

## 2025-01-07 PROCEDURE — 25510000001 IOPAMIDOL PER 1 ML: Performed by: NURSE PRACTITIONER

## 2025-01-07 PROCEDURE — 74177 CT ABD & PELVIS W/CONTRAST: CPT

## 2025-01-07 RX ORDER — IOPAMIDOL 755 MG/ML
100 INJECTION, SOLUTION INTRAVASCULAR
Status: COMPLETED | OUTPATIENT
Start: 2025-01-07 | End: 2025-01-07

## 2025-01-07 RX ADMIN — IOPAMIDOL 100 ML: 755 INJECTION, SOLUTION INTRAVENOUS at 09:21

## 2025-01-10 ENCOUNTER — HOSPITAL ENCOUNTER (OUTPATIENT)
Dept: MAMMOGRAPHY | Facility: HOSPITAL | Age: 45
Discharge: HOME | End: 2025-01-10
Admitting: NURSE PRACTITIONER
Payer: COMMERCIAL

## 2025-01-10 DIAGNOSIS — Z12.31 SCREENING MAMMOGRAM, ENCOUNTER FOR: ICD-10-CM

## 2025-01-10 PROCEDURE — 77067 SCR MAMMO BI INCL CAD: CPT

## 2025-01-10 PROCEDURE — 77063 BREAST TOMOSYNTHESIS BI: CPT

## 2025-06-18 ENCOUNTER — OFFICE VISIT (OUTPATIENT)
Dept: GASTROENTEROLOGY | Facility: CLINIC | Age: 45
End: 2025-06-18
Payer: COMMERCIAL

## 2025-06-18 VITALS
DIASTOLIC BLOOD PRESSURE: 73 MMHG | HEIGHT: 64 IN | WEIGHT: 158.6 LBS | HEART RATE: 66 BPM | SYSTOLIC BLOOD PRESSURE: 122 MMHG | BODY MASS INDEX: 27.08 KG/M2

## 2025-06-18 DIAGNOSIS — R10.32 LEFT LOWER QUADRANT ABDOMINAL PAIN: Primary | ICD-10-CM

## 2025-06-18 DIAGNOSIS — R19.8 IRREGULAR BOWEL HABITS: ICD-10-CM

## 2025-06-18 PROBLEM — F41.9 ANXIETY AND DEPRESSION: Status: ACTIVE | Noted: 2025-06-18

## 2025-06-18 PROBLEM — E53.8 B12 DEFICIENCY: Status: ACTIVE | Noted: 2025-06-18

## 2025-06-18 PROBLEM — F32.A ANXIETY AND DEPRESSION: Status: ACTIVE | Noted: 2025-06-18

## 2025-06-18 PROBLEM — E55.9 AVITAMINOSIS D: Status: ACTIVE | Noted: 2025-06-18

## 2025-06-18 PROBLEM — K57.90 DIVERTICULOSIS: Status: ACTIVE | Noted: 2017-08-18

## 2025-06-18 RX ORDER — SODIUM, POTASSIUM,MAG SULFATES 17.5-3.13G
2 SOLUTION, RECONSTITUTED, ORAL ORAL ONCE
Qty: 354 ML | Refills: 0 | Status: SHIPPED | OUTPATIENT
Start: 2025-06-18 | End: 2025-06-18

## 2025-06-18 RX ORDER — VENLAFAXINE HYDROCHLORIDE 37.5 MG/1
TABLET, EXTENDED RELEASE ORAL
COMMUNITY
Start: 2024-12-11

## 2025-06-18 NOTE — PROGRESS NOTES
Chief Complaint     Abdominal Pain    Patient or patient representative verbalized consent for the use of Ambient Listening during the visit with  HARESH Arias for chart documentation. 6/18/2025  09:51 EDT    History of Present Illness     History of Present Illness  The patient presents for evaluation of abdominal pain.    She experiences intermittent LLQ abdominal pain, which is not a daily occurrence. The pain, described as brief and transient, can be triggered by standing or walking. The frequency of these episodes varies, with some days being pain-free while others are marked by discomfort. The onset of this pain was noted in 11/2024 or 12/2024, and it has since shown some improvement. Her bowel movements are irregular, occurring either daily or every three to four days, but there is no correlation between the frequency of her bowel movements and the abdominal pain. The pain is predominantly localized to the left side, with occasional right-sided discomfort. She also reports intermittent lower back pain but is uncertain if it coincides with the abdominal pain. She has been experiencing an irregular bowel pattern for several years, which she attributes to her hysterectomy. She has gained weight since quitting smoking and despite attempts at exercise, she has not observed any improvement in her symptoms. She finds relief by assuming a crouched position momentarily before resuming an upright stance.              History      Past Medical History:   Diagnosis Date    Closed fracture of left distal radius     TIA (transient ischemic attack)     2011 NO RESIDUAL REPORTED TOLD CAUSED BIRTH CONTROL/SMOKING AND REPORTS NO ISSUES SINCE       Past Surgical History:   Procedure Laterality Date    BREAST BIOPSY Left     (-)    DILATATION AND CURETTAGE      EARLY 1990'S    HYSTERECTOMY      ORIF WRIST FRACTURE Left 7/14/2022    Procedure: OPEN REDUCTION INTERNAL FIXATION OF LEFT DISTAL RADIUS FRACTURE;  Surgeon:  "Cole Velásquez MD;  Location: [facility] MA[facility] OR;  Service: Orthopedics;  Laterality: Left;       Family History   Problem Relation Age of Onset    Breast cancer Paternal Aunt     Breast cancer Paternal Cousin         Current Medications        Current Outpatient Medications:     venlafaxine 37.5 MG tablet sustained-release 24 hour 24 hr tablet, , Disp: , Rfl:     naproxen sodium (ALEVE) 220 MG tablet, Take 220 mg by mouth 2 (Two) Times a Day As Needed. (Patient not taking: Reported on 6/18/2025), Disp: , Rfl:     sodium-potassium-magnesium sulfates (SUPREP) 17.5-3.13-1.6 GM/177ML solution oral solution, Take 2 bottles by mouth 1 (One) Time for 1 dose. Take as directed, Disp: 354 mL, Rfl: 0     Allergies     Allergies   Allergen Reactions    Oxycodone Itching     AND BECOMES ANXIOUS AND MEAN       Social History       Social History     Social History Narrative    Not on file       Immunizations     Immunization:    There is no immunization history on file for this patient.       Objective     Objective     Vital Signs:   /73 (BP Location: Left arm, Patient Position: Sitting, Cuff Size: Adult)   Pulse 66   Ht 162.6 cm (64.02\")   Wt 71.9 kg (158 lb 9.6 oz)   BMI 27.21 kg/m²       Physical Exam    Results      Result Review :               Results  Imaging   - CT abdomen, pelvis with contrast: 01/07/2025, Unremarkable liver and pancreas. No evidence of bowel obstruction. No acute findings noted throughout the abdomen and pelvis.    Diagnostic Testing   - Colonoscopy: 2017, All biopsies were normal. No inflammation or concerning findings. Some diverticulosis in the left side of the colon.           Assessment and Plan        Assessment and Plan    Diagnoses and all orders for this visit:    1. Left lower quadrant abdominal pain (Primary)  -     Case Request; Standing  -     Case Request    2. Irregular bowel habits  -     Case Request; Standing  -     Case Request    Other orders  -     Follow Anesthesia " Guidelines / Protocol; Standing  -     Follow Anesthesia Guidelines / Protocol; Future  -     sodium-potassium-magnesium sulfates (SUPREP) 17.5-3.13-1.6 GM/177ML solution oral solution; Take 2 bottles by mouth 1 (One) Time for 1 dose. Take as directed  Dispense: 354 mL; Refill: 0  -     Verify NPO; Standing  -     Verify Bowel Prep Was Successful; Standing  -     Give Tap Water Enema If Bowel Prep Insufficient; Standing  -     Obtain Informed Consent; Standing        Assessment & Plan  1. Abdominal pain:  - Schedule colonoscopy to investigate the cause of pain.  - Maintain a diary detailing the occurrence of pain, including potential triggers such as recent meals or bowel movements.  - Provide handout on bowel preparation.      COLONOSCOPY (N/A)Surgical Risk and Benefits discussed: Possible risks/complications, benefits, and alternatives to surgical or invasive procedure have been explained to patient and/or legal guardian; risks include bleeding, infection, and perforation. Patient has been evaluated and can tolerate anesthesia and/or sedation. Risks, benefits, and alternatives to anesthesia and sedation have been explained to patient and/or legal guardian.        Follow Up        Follow Up   Return in about 7 months (around 1/18/2026) for abdominal pain.  Patient was given instructions and counseling regarding her condition or for health maintenance advice. Please see specific information pulled into the AVS if appropriate.

## 2025-06-26 ENCOUNTER — PATIENT ROUNDING (BHMG ONLY) (OUTPATIENT)
Dept: GASTROENTEROLOGY | Facility: CLINIC | Age: 45
End: 2025-06-26
Payer: COMMERCIAL

## 2025-06-26 NOTE — PROGRESS NOTES
"6/26/2025      [facility], may I speak with Shanell Rollins     My name is Yamila. I am calling from [facility]. I show that you had a recent visit with HARESH Ruiz.    Before we get started may I verify your date of birth? 1980    I am calling to officially welcome you to our practice and ask about your recent visit. Is this a good time to talk?  Yes     Tell me about your visit with us. What things went well? \"Holly was nice, helpful & very informative, I appreciated the card also\"    We strive to ensure that we protect your safety and privacy. Is there anything we could have done to improve this during your visit?    No     We're always looking for ways to make our patients' experiences even better. Do you have recommendations on ways we may improve?   No     Overall were you satisfied with your first visit to our practice?   Yes     I appreciate you taking the time to speak with me today. Is there anything else I can do for you?   No      I am glad to hear that you had a very good visit and I appreciate you taking the time to provide feedback on this call. We would greatly appreciate you filling out a survey if you receive one in the mail, email or text. This is a great opportunity to provide any additional feedback that you may think of after this call as well.       Thank you, and have a great day.   "

## 2025-07-08 RX ORDER — SODIUM, POTASSIUM,MAG SULFATES 17.5-3.13G
2 SOLUTION, RECONSTITUTED, ORAL ORAL ONCE
Qty: 354 ML | Refills: 0 | Status: SHIPPED | OUTPATIENT
Start: 2025-07-08 | End: 2025-07-08

## 2025-07-14 ENCOUNTER — TELEPHONE (OUTPATIENT)
Dept: GASTROENTEROLOGY | Facility: CLINIC | Age: 45
End: 2025-07-14
Payer: COMMERCIAL

## 2025-07-14 NOTE — TELEPHONE ENCOUNTER
7/14/2025    Shanell Rollins  [address] [place] [address]    Dear Shanell Rollins (1980):    Thank you for choosing [facility]. Your health is our foremost concern, which is why we are trying to contact you regarding the appointment you have with us on 01/19/26@10:45. Unfortunately, HARESH Ruiz is out of the office and we need to reschedule your appointment. We have rescheduled your appointment for 01/26/26@10:45 with HARESH Ruiz.  We attempted to reach you by phone.    If this new appointment does not work for you, please reschedule your appointment by contacting the office at [phone].     Again, we value you as a patient and thank you for allowing [facility] to provide all of your healthcare needs.     Sincerely,    [facility]

## 2025-07-15 ENCOUNTER — TELEPHONE (OUTPATIENT)
Dept: GASTROENTEROLOGY | Facility: CLINIC | Age: 45
End: 2025-07-15
Payer: COMMERCIAL

## 2025-07-15 NOTE — TELEPHONE ENCOUNTER
ENDO RECONCILIATION  Verify source of procedure(s): Office visit  If other, please list source: 6/18/25    TIME OUT-CONFIRM CORRECT PROCEDURE: Colonoscopy  Cardiology:  Pulmonology:  Blood thinner:   GLP-1:  Additional DX/indication for procedure:     Please include any other notes relevant to endo reconciliation: N/A

## 2025-08-14 ENCOUNTER — ANESTHESIA EVENT (OUTPATIENT)
Dept: GASTROENTEROLOGY | Facility: HOSPITAL | Age: 45
End: 2025-08-14
Payer: COMMERCIAL

## 2025-08-14 RX ORDER — SODIUM CHLORIDE, SODIUM LACTATE, POTASSIUM CHLORIDE, CALCIUM CHLORIDE 600; 310; 30; 20 MG/100ML; MG/100ML; MG/100ML; MG/100ML
150 INJECTION, SOLUTION INTRAVENOUS CONTINUOUS
Status: CANCELLED | OUTPATIENT
Start: 2025-08-15 | End: 2025-08-15

## 2025-08-15 ENCOUNTER — HOSPITAL ENCOUNTER (OUTPATIENT)
Facility: HOSPITAL | Age: 45
Setting detail: HOSPITAL OUTPATIENT SURGERY
Discharge: HOME | End: 2025-08-15
Attending: INTERNAL MEDICINE | Admitting: INTERNAL MEDICINE
Payer: COMMERCIAL

## 2025-08-15 ENCOUNTER — ANESTHESIA (OUTPATIENT)
Dept: GASTROENTEROLOGY | Facility: HOSPITAL | Age: 45
End: 2025-08-15
Payer: COMMERCIAL

## 2025-08-15 VITALS
SYSTOLIC BLOOD PRESSURE: 121 MMHG | DIASTOLIC BLOOD PRESSURE: 70 MMHG | WEIGHT: 157.41 LBS | HEIGHT: 63 IN | OXYGEN SATURATION: 97 % | RESPIRATION RATE: 19 BRPM | BODY MASS INDEX: 27.89 KG/M2 | TEMPERATURE: 96.8 F | HEART RATE: 69 BPM

## 2025-08-15 PROCEDURE — 25810000003 LACTATED RINGERS PER 1000 ML: Performed by: NURSE ANESTHETIST, CERTIFIED REGISTERED

## 2025-08-15 PROCEDURE — 25010000002 PROPOFOL 10 MG/ML EMULSION: Performed by: NURSE ANESTHETIST, CERTIFIED REGISTERED

## 2025-08-15 PROCEDURE — 25010000002 LIDOCAINE PF 2% 2 % SOLUTION: Performed by: NURSE ANESTHETIST, CERTIFIED REGISTERED

## 2025-08-15 PROCEDURE — 45378 DIAGNOSTIC COLONOSCOPY: CPT | Performed by: INTERNAL MEDICINE

## 2025-08-15 RX ORDER — PROPOFOL 10 MG/ML
VIAL (ML) INTRAVENOUS AS NEEDED
Status: DISCONTINUED | OUTPATIENT
Start: 2025-08-15 | End: 2025-08-15 | Stop reason: SURG

## 2025-08-15 RX ORDER — LIDOCAINE HYDROCHLORIDE 20 MG/ML
INJECTION, SOLUTION EPIDURAL; INFILTRATION; INTRACAUDAL; PERINEURAL AS NEEDED
Status: DISCONTINUED | OUTPATIENT
Start: 2025-08-15 | End: 2025-08-15 | Stop reason: SURG

## 2025-08-15 RX ORDER — SODIUM CHLORIDE, SODIUM LACTATE, POTASSIUM CHLORIDE, CALCIUM CHLORIDE 600; 310; 30; 20 MG/100ML; MG/100ML; MG/100ML; MG/100ML
INJECTION, SOLUTION INTRAVENOUS CONTINUOUS PRN
Status: DISCONTINUED | OUTPATIENT
Start: 2025-08-15 | End: 2025-08-15 | Stop reason: SURG

## 2025-08-15 RX ADMIN — LIDOCAINE HYDROCHLORIDE 60 MG: 20 INJECTION, SOLUTION EPIDURAL; INFILTRATION; INTRACAUDAL; PERINEURAL at 11:32

## 2025-08-15 RX ADMIN — PROPOFOL 250 MCG/KG/MIN: 10 INJECTION, EMULSION INTRAVENOUS at 11:32

## 2025-08-15 RX ADMIN — SODIUM CHLORIDE, POTASSIUM CHLORIDE, SODIUM LACTATE AND CALCIUM CHLORIDE: 600; 310; 30; 20 INJECTION, SOLUTION INTRAVENOUS at 11:24

## 2025-08-15 RX ADMIN — PROPOFOL 100 MG: 10 INJECTION, EMULSION INTRAVENOUS at 11:32

## 2025-08-18 ENCOUNTER — TELEPHONE (OUTPATIENT)
Dept: GASTROENTEROLOGY | Facility: CLINIC | Age: 45
End: 2025-08-18
Payer: COMMERCIAL

## (undated) DEVICE — KIRSCHNER WIRE
Type: IMPLANTABLE DEVICE | Site: WRIST | Status: NON-FUNCTIONAL
Brand: VARIAX
Removed: 2022-07-14

## (undated) DEVICE — CONN JET HYDRA H20 AUXILIARY DISP

## (undated) DEVICE — DRILL BIT, AO, SCALED: Brand: VARIAX

## (undated) DEVICE — INTENDED FOR TISSUE SEPARATION, AND OTHER PROCEDURES THAT REQUIRE A SHARP SURGICAL BLADE TO PUNCTURE OR CUT.: Brand: BARD-PARKER ® CARBON RIB-BACK BLADES

## (undated) DEVICE — EXTREMITY-LF: Brand: MEDLINE INDUSTRIES, INC.

## (undated) DEVICE — GLV SURG SENSICARE PI ORTHO SZ8 LF STRL

## (undated) DEVICE — SUT ETHLN 3-0 FS118IN 663H

## (undated) DEVICE — DISPOSABLE TOURNIQUET CUFF SINGLE BLADDER, SINGLE PORT AND QUICK CONNECT CONNECTOR: Brand: COLOR CUFF

## (undated) DEVICE — COMFORT ARM SLING: Brand: DEROYAL

## (undated) DEVICE — TRAY,IRRIGATION,BULBSYRINGE,60ML,CSR,PVP: Brand: MEDLINE

## (undated) DEVICE — GOWN,REINF,POLY,SIRUS,BRTH SLV,XLNG/XXL: Brand: MEDLINE

## (undated) DEVICE — STERILE POLYISOPRENE POWDER-FREE SURGICAL GLOVES WITH EMOLLIENT COATING: Brand: PROTEXIS

## (undated) DEVICE — PAD GRND REM POLYHESIVE A/ DISP

## (undated) DEVICE — BNDG ELAS DELUXE REINF 10CM 5MTR STRL

## (undated) DEVICE — STERILE POLYISOPRENE POWDER-FREE SURGICAL GLOVES: Brand: PROTEXIS

## (undated) DEVICE — LINER SURG CANSTR SXN S/RIGD 1500CC

## (undated) DEVICE — SOL IRRG H2O PL/BG 1000ML STRL

## (undated) DEVICE — BNDG ESMARK 4IN 12FT LF STRL BLU

## (undated) DEVICE — PENCL E/S SMOKEEVAC W/TELESCP CANN

## (undated) DEVICE — Device

## (undated) DEVICE — SLV SCD KN/LEN ADJ EXPRSS BLENDED MD 1P/U

## (undated) DEVICE — SCREWDRIVER BLADE T8 AO, SELF RETAINING: Brand: VARIAX

## (undated) DEVICE — 3M™ STERI-DRAPE™ X-RAY IMAGE INTENSIFIER DRAPE, 10 PER CARTON / 4 CARTONS PER CASE, 1013: Brand: STERI-DRAPE™

## (undated) DEVICE — SUT VIC 3/0 SH 27IN J416H

## (undated) DEVICE — DRSNG GZ PETROLTM XEROFORM CURAD 1X8IN STRL

## (undated) DEVICE — DEFENDO AIR WATER SUCTION AND BIOPSY VALVE KIT: Brand: DEFENDO AIR/WATER/SUCTION AND BIOPSY VALVE

## (undated) DEVICE — SOLIDIFIER LIQLOC PLS 1500CC BT

## (undated) DEVICE — GAUZE,SPONGE,4"X4",16PLY,STRL,LF,10/TRAY: Brand: MEDLINE

## (undated) DEVICE — APPL CHLORAPREP 26ML CLR

## (undated) DEVICE — UNDERCAST PADDING: Brand: DEROYAL

## (undated) DEVICE — GLV SURG SENSICARE SLT PF LF 6 STRL